# Patient Record
Sex: FEMALE | HISPANIC OR LATINO | Employment: UNEMPLOYED | URBAN - METROPOLITAN AREA
[De-identification: names, ages, dates, MRNs, and addresses within clinical notes are randomized per-mention and may not be internally consistent; named-entity substitution may affect disease eponyms.]

---

## 2024-06-17 ENCOUNTER — OFFICE VISIT (OUTPATIENT)
Dept: FAMILY MEDICINE CLINIC | Age: 74
End: 2024-06-17
Payer: COMMERCIAL

## 2024-06-17 VITALS
HEART RATE: 90 BPM | BODY MASS INDEX: 22.42 KG/M2 | WEIGHT: 114.2 LBS | SYSTOLIC BLOOD PRESSURE: 138 MMHG | DIASTOLIC BLOOD PRESSURE: 88 MMHG | HEIGHT: 60 IN | OXYGEN SATURATION: 99 %

## 2024-06-17 DIAGNOSIS — Z76.89 ENCOUNTER TO ESTABLISH CARE: ICD-10-CM

## 2024-06-17 DIAGNOSIS — Z11.4 SCREENING FOR HIV (HUMAN IMMUNODEFICIENCY VIRUS): ICD-10-CM

## 2024-06-17 DIAGNOSIS — Z11.59 NEED FOR HEPATITIS C SCREENING TEST: ICD-10-CM

## 2024-06-17 DIAGNOSIS — I10 HYPERTENSION, UNSPECIFIED TYPE: ICD-10-CM

## 2024-06-17 DIAGNOSIS — Z87.442 HISTORY OF KIDNEY STONES: ICD-10-CM

## 2024-06-17 DIAGNOSIS — M45.9 ANKYLOSING SPONDYLITIS, UNSPECIFIED SITE OF SPINE (HCC): ICD-10-CM

## 2024-06-17 DIAGNOSIS — Z11.1 TUBERCULOSIS SCREENING: ICD-10-CM

## 2024-06-17 DIAGNOSIS — Z11.59 SCREENING FOR VIRAL DISEASE: ICD-10-CM

## 2024-06-17 DIAGNOSIS — Z12.31 ENCOUNTER FOR SCREENING MAMMOGRAM FOR MALIGNANT NEOPLASM OF BREAST: ICD-10-CM

## 2024-06-17 DIAGNOSIS — Z12.11 SCREENING FOR COLON CANCER: ICD-10-CM

## 2024-06-17 DIAGNOSIS — K80.20 GALLSTONES: ICD-10-CM

## 2024-06-17 DIAGNOSIS — R73.9 HYPERGLYCEMIA: ICD-10-CM

## 2024-06-17 DIAGNOSIS — R73.03 PREDIABETES: Primary | ICD-10-CM

## 2024-06-17 DIAGNOSIS — Z23 NEED FOR PNEUMOCOCCAL VACCINATION: ICD-10-CM

## 2024-06-17 DIAGNOSIS — R53.83 OTHER FATIGUE: ICD-10-CM

## 2024-06-17 DIAGNOSIS — R31.9 HEMATURIA, UNSPECIFIED TYPE: ICD-10-CM

## 2024-06-17 DIAGNOSIS — Z23 NEED FOR TDAP VACCINATION: ICD-10-CM

## 2024-06-17 DIAGNOSIS — Z78.0 POST-MENOPAUSAL: ICD-10-CM

## 2024-06-17 DIAGNOSIS — Z13.220 SCREENING FOR HYPERLIPIDEMIA: ICD-10-CM

## 2024-06-17 DIAGNOSIS — D64.9 ANEMIA, UNSPECIFIED TYPE: ICD-10-CM

## 2024-06-17 PROCEDURE — 90471 IMMUNIZATION ADMIN: CPT | Performed by: NURSE PRACTITIONER

## 2024-06-17 PROCEDURE — 99205 OFFICE O/P NEW HI 60 MIN: CPT | Performed by: NURSE PRACTITIONER

## 2024-06-17 PROCEDURE — 3075F SYST BP GE 130 - 139MM HG: CPT | Performed by: NURSE PRACTITIONER

## 2024-06-17 PROCEDURE — 90677 PCV20 VACCINE IM: CPT | Performed by: NURSE PRACTITIONER

## 2024-06-17 PROCEDURE — 3079F DIAST BP 80-89 MM HG: CPT | Performed by: NURSE PRACTITIONER

## 2024-06-17 PROCEDURE — 1123F ACP DISCUSS/DSCN MKR DOCD: CPT | Performed by: NURSE PRACTITIONER

## 2024-06-17 RX ORDER — BLOOD PRESSURE TEST KIT
KIT MISCELLANEOUS
Qty: 1 KIT | Refills: 0 | Status: SHIPPED | OUTPATIENT
Start: 2024-06-17

## 2024-06-17 RX ORDER — ONDANSETRON 4 MG/1
4 TABLET, ORALLY DISINTEGRATING ORAL 3 TIMES DAILY PRN
Qty: 21 TABLET | Refills: 0 | Status: SHIPPED | OUTPATIENT
Start: 2024-06-17

## 2024-06-17 RX ORDER — AMLODIPINE BESYLATE 5 MG/1
5 TABLET ORAL DAILY
Qty: 30 TABLET | Refills: 3 | Status: SHIPPED | OUTPATIENT
Start: 2024-06-17

## 2024-06-17 SDOH — ECONOMIC STABILITY: FOOD INSECURITY: WITHIN THE PAST 12 MONTHS, YOU WORRIED THAT YOUR FOOD WOULD RUN OUT BEFORE YOU GOT MONEY TO BUY MORE.: NEVER TRUE

## 2024-06-17 SDOH — ECONOMIC STABILITY: HOUSING INSECURITY
IN THE LAST 12 MONTHS, WAS THERE A TIME WHEN YOU DID NOT HAVE A STEADY PLACE TO SLEEP OR SLEPT IN A SHELTER (INCLUDING NOW)?: NO

## 2024-06-17 SDOH — ECONOMIC STABILITY: INCOME INSECURITY: HOW HARD IS IT FOR YOU TO PAY FOR THE VERY BASICS LIKE FOOD, HOUSING, MEDICAL CARE, AND HEATING?: NOT HARD AT ALL

## 2024-06-17 SDOH — ECONOMIC STABILITY: FOOD INSECURITY: WITHIN THE PAST 12 MONTHS, THE FOOD YOU BOUGHT JUST DIDN'T LAST AND YOU DIDN'T HAVE MONEY TO GET MORE.: NEVER TRUE

## 2024-06-17 ASSESSMENT — ENCOUNTER SYMPTOMS
VOMITING: 0
SHORTNESS OF BREATH: 0
BACK PAIN: 1
ABDOMINAL PAIN: 1
ABDOMINAL DISTENTION: 0
ANAL BLEEDING: 0
COUGH: 0
CONSTIPATION: 0
RECTAL PAIN: 0
BLOOD IN STOOL: 0
DIARRHEA: 0
CHEST TIGHTNESS: 0
NAUSEA: 1
WHEEZING: 0

## 2024-06-17 ASSESSMENT — PATIENT HEALTH QUESTIONNAIRE - PHQ9
SUM OF ALL RESPONSES TO PHQ QUESTIONS 1-9: 0
2. FEELING DOWN, DEPRESSED OR HOPELESS: NOT AT ALL
SUM OF ALL RESPONSES TO PHQ QUESTIONS 1-9: 0
SUM OF ALL RESPONSES TO PHQ9 QUESTIONS 1 & 2: 0
1. LITTLE INTEREST OR PLEASURE IN DOING THINGS: NOT AT ALL
SUM OF ALL RESPONSES TO PHQ QUESTIONS 1-9: 0
SUM OF ALL RESPONSES TO PHQ QUESTIONS 1-9: 0

## 2024-06-17 NOTE — PROGRESS NOTES
Visit Information    Have you changed or started any medications since your last visit including any over-the-counter medicines, vitamins, or herbal medicines? no   Are you having any side effects from any of your medications? -  no  Have you stopped taking any of your medications? Is so, why? -  no    Have you seen any other physician or provider since your last visit? No  Have you had any other diagnostic tests since your last visit? No  Have you been seen in the emergency room and/or had an admission to a hospital since we last saw you? No  Have you had your routine dental cleaning in the past 6 months? no    Have you activated your [employer] account? If not, what are your barriers? Yes     Patient Care Team:  Alen Samuel APRN - CNP as PCP - General (Nurse Practitioner)    Medical History Review  Past Medical, Family, and Social History reviewed and does contribute to the patient presenting condition    Health Maintenance   Topic Date Due    COVID-19 Vaccine (1) Never done    Depression Screen  Never done    Hepatitis C screen  Never done    DTaP/Tdap/Td vaccine (1 - Tdap) Never done    Lipids  Never done    Breast cancer screen  Never done    Colorectal Cancer Screen  Never done    Shingles vaccine (1 of 2) Never done    DEXA (modify frequency per FRAX score)  Never done    Respiratory Syncytial Virus (RSV) Pregnant or age 60 yrs+ (1 - 1-dose 60+ series) Never done    Pneumococcal 65+ years Vaccine (1 of 1 - PCV) Never done    Flu vaccine (Season Ended) 08/01/2024    Hepatitis A vaccine  Aged Out    Hepatitis B vaccine  Aged Out    Hib vaccine  Aged Out    Polio vaccine  Aged Out    Meningococcal (ACWY) vaccine  Aged Out     
 SECTION      x 2       Family History   Problem Relation Age of Onset    Diabetes Mother        Social History     Tobacco Use    Smoking status: Never    Smokeless tobacco: Never   Vaping Use    Vaping Use: Never used   Substance Use Topics    Alcohol use: Never    Drug use: Never         SUBJECTIVE/OBJECTIVE:    Patient is here today with her granddaughter to establish care.  She recently moved here from [place] about a month ago.  Reports a history of prediabetes and hypertension, was previously on amlodipine and metformin, medications refilled.  Also reports a history of ankylosing spondylitis and was following with a rheumatologist in [place], I will place a new referral for her.    Patient's granddaughter also reports the patient has a history of gallstones, there is talk about possible cholecystectomy when she was in [place].  She tells me that as of recently patient has had a decrease in her appetite, and has had some abdominal pain.  On my exam, her abdomen is nontender and soft.  Patient's daughter also tells me that there has been some blood in her stool, she does have a known history of kidney stones as well as urinary tract infections.  No CVA tenderness bilaterally, I will order an ultrasound of the gallbladder as well as a CT scan of the abdomen pelvis.  I will also send referral to GI.  Also patient has a history of anemia.  Referral also sent to urology.    Baseline lab work ordered.    Would like to see the patient back in about  2 weeks for blood pressure check.  Blood pressure is well-controlled today, will restart her amlodipine.  Patient and granddaughter are in agreements with plan of care.    She is due for mammogram and DEXA scan, orders have been placed for this.  No history of colonoscopy, will place order for Cologuard.  Denies having any family history of colon cancer that is known, no history of any blood in her stool.     [x]Negative depression screening.   []1-4 = Minimal depression

## 2024-06-19 ENCOUNTER — HOSPITAL ENCOUNTER (OUTPATIENT)
Dept: WOMENS IMAGING | Age: 74
Discharge: HOME | End: 2024-06-21
Payer: COMMERCIAL

## 2024-06-19 ENCOUNTER — HOSPITAL ENCOUNTER (OUTPATIENT)
Dept: CT IMAGING | Age: 74
Discharge: HOME | End: 2024-06-21
Payer: COMMERCIAL

## 2024-06-19 ENCOUNTER — HOSPITAL ENCOUNTER (OUTPATIENT)
Age: 74
Discharge: HOME | End: 2024-06-19
Payer: COMMERCIAL

## 2024-06-19 DIAGNOSIS — N28.1 RENAL CYST, LEFT: ICD-10-CM

## 2024-06-19 DIAGNOSIS — K59.00 CONSTIPATION, UNSPECIFIED CONSTIPATION TYPE: ICD-10-CM

## 2024-06-19 DIAGNOSIS — R31.9 HEMATURIA, UNSPECIFIED TYPE: ICD-10-CM

## 2024-06-19 DIAGNOSIS — R80.9 PROTEINURIA, UNSPECIFIED TYPE: ICD-10-CM

## 2024-06-19 DIAGNOSIS — K80.20 CALCULUS OF GALLBLADDER WITHOUT CHOLECYSTITIS WITHOUT OBSTRUCTION: Primary | ICD-10-CM

## 2024-06-19 DIAGNOSIS — E55.9 VITAMIN D DEFICIENCY: Primary | ICD-10-CM

## 2024-06-19 DIAGNOSIS — R31.9 URINARY TRACT INFECTION WITH HEMATURIA, SITE UNSPECIFIED: ICD-10-CM

## 2024-06-19 DIAGNOSIS — Z13.220 SCREENING FOR HYPERLIPIDEMIA: ICD-10-CM

## 2024-06-19 DIAGNOSIS — Z11.4 SCREENING FOR HIV (HUMAN IMMUNODEFICIENCY VIRUS): ICD-10-CM

## 2024-06-19 DIAGNOSIS — J90 PLEURAL EFFUSION: ICD-10-CM

## 2024-06-19 DIAGNOSIS — D64.9 ANEMIA, UNSPECIFIED TYPE: ICD-10-CM

## 2024-06-19 DIAGNOSIS — R73.9 HYPERGLYCEMIA: ICD-10-CM

## 2024-06-19 DIAGNOSIS — Z78.0 POST-MENOPAUSAL: ICD-10-CM

## 2024-06-19 DIAGNOSIS — D50.9 IRON DEFICIENCY ANEMIA, UNSPECIFIED IRON DEFICIENCY ANEMIA TYPE: ICD-10-CM

## 2024-06-19 DIAGNOSIS — R53.83 OTHER FATIGUE: ICD-10-CM

## 2024-06-19 DIAGNOSIS — Z11.1 TUBERCULOSIS SCREENING: ICD-10-CM

## 2024-06-19 DIAGNOSIS — Z12.31 ENCOUNTER FOR SCREENING MAMMOGRAM FOR MALIGNANT NEOPLASM OF BREAST: ICD-10-CM

## 2024-06-19 DIAGNOSIS — Z11.59 NEED FOR HEPATITIS C SCREENING TEST: ICD-10-CM

## 2024-06-19 DIAGNOSIS — Z87.442 HISTORY OF KIDNEY STONES: ICD-10-CM

## 2024-06-19 DIAGNOSIS — Z11.59 SCREENING FOR VIRAL DISEASE: ICD-10-CM

## 2024-06-19 DIAGNOSIS — K80.20 GALLSTONES: ICD-10-CM

## 2024-06-19 DIAGNOSIS — I10 HYPERTENSION, UNSPECIFIED TYPE: ICD-10-CM

## 2024-06-19 DIAGNOSIS — N39.0 URINARY TRACT INFECTION WITH HEMATURIA, SITE UNSPECIFIED: ICD-10-CM

## 2024-06-19 LAB
25(OH)D3 SERPL-MCNC: 14.1 NG/ML (ref 30–100)
ALBUMIN SERPL-MCNC: 3.3 G/DL (ref 3.5–5.2)
ALP SERPL-CCNC: 105 U/L (ref 35–104)
ALT SERPL-CCNC: 11 U/L (ref 5–33)
ANION GAP SERPL CALCULATED.3IONS-SCNC: 10 MMOL/L (ref 9–17)
AST SERPL-CCNC: 17 U/L
BACTERIA URNS QL MICRO: ABNORMAL
BILIRUB SERPL-MCNC: 0.3 MG/DL (ref 0.3–1.2)
BILIRUB UR QL STRIP: NEGATIVE
BUN SERPL-MCNC: 16 MG/DL (ref 8–23)
CALCIUM SERPL-MCNC: 8.6 MG/DL (ref 8.6–10.4)
CASTS #/AREA URNS LPF: ABNORMAL /LPF
CHLORIDE SERPL-SCNC: 105 MMOL/L (ref 98–107)
CHOLEST SERPL-MCNC: 111 MG/DL
CHOLESTEROL/HDL RATIO: 2.8
CLARITY UR: CLEAR
CO2 SERPL-SCNC: 25 MMOL/L (ref 20–31)
COLOR UR: YELLOW
CREAT SERPL-MCNC: 0.7 MG/DL (ref 0.5–0.9)
EPI CELLS #/AREA URNS HPF: ABNORMAL /HPF
ERYTHROCYTE [DISTWIDTH] IN BLOOD BY AUTOMATED COUNT: 13.7 % (ref 11.5–14.9)
EST. AVERAGE GLUCOSE BLD GHB EST-MCNC: 114 MG/DL
FERRITIN SERPL-MCNC: 49 NG/ML (ref 13–150)
FOLATE SERPL-MCNC: 6.3 NG/ML (ref 4.8–24.2)
GFR, ESTIMATED: >90 ML/MIN/1.73M2
GLUCOSE SERPL-MCNC: 90 MG/DL (ref 70–99)
GLUCOSE UR STRIP-MCNC: NEGATIVE MG/DL
HBA1C MFR BLD: 5.6 % (ref 4–6)
HBV SURFACE AB SERPL IA-ACNC: <3.5 MIU/ML
HCT VFR BLD AUTO: 35 % (ref 36–46)
HCV AB SERPL QL IA: NONREACTIVE
HDLC SERPL-MCNC: 40 MG/DL
HGB BLD-MCNC: 11.5 G/DL (ref 12–16)
HGB UR QL STRIP.AUTO: ABNORMAL
HIV 1+2 AB+HIV1 P24 AG SERPL QL IA: NONREACTIVE
IRON SATN MFR SERPL: 22 % (ref 20–55)
IRON SERPL-MCNC: 44 UG/DL (ref 37–145)
KETONES UR STRIP-MCNC: NEGATIVE MG/DL
LDLC SERPL CALC-MCNC: 52 MG/DL (ref 0–130)
LEUKOCYTE ESTERASE UR QL STRIP: ABNORMAL
MAGNESIUM SERPL-MCNC: 2.1 MG/DL (ref 1.6–2.6)
MCH RBC QN AUTO: 32.3 PG (ref 26–34)
MCHC RBC AUTO-ENTMCNC: 32.9 G/DL (ref 31–37)
MCV RBC AUTO: 98.3 FL (ref 80–100)
NITRITE UR QL STRIP: NEGATIVE
PH UR STRIP: 6.5 [PH] (ref 5–8)
PHOSPHATE SERPL-MCNC: 3.5 MG/DL (ref 2.6–4.5)
PLATELET # BLD AUTO: 172 K/UL (ref 150–450)
PMV BLD AUTO: 9.6 FL (ref 6–12)
POTASSIUM SERPL-SCNC: 4.8 MMOL/L (ref 3.7–5.3)
PROT SERPL-MCNC: 8 G/DL (ref 6.4–8.3)
PROT UR STRIP-MCNC: ABNORMAL MG/DL
RBC # BLD AUTO: 3.56 M/UL (ref 4–5.2)
RBC #/AREA URNS HPF: ABNORMAL /HPF
SODIUM SERPL-SCNC: 140 MMOL/L (ref 135–144)
SP GR UR STRIP: 1.01 (ref 1–1.03)
TIBC SERPL-MCNC: 199 UG/DL (ref 250–450)
TRIGL SERPL-MCNC: 94 MG/DL
TSH SERPL DL<=0.05 MIU/L-ACNC: 1.28 UIU/ML (ref 0.3–5)
UNSATURATED IRON BINDING CAPACITY: 155 UG/DL (ref 112–347)
URATE SERPL-MCNC: 5.9 MG/DL (ref 2.4–5.7)
UROBILINOGEN UR STRIP-ACNC: NORMAL EU/DL (ref 0–1)
VIT B12 SERPL-MCNC: 901 PG/ML (ref 232–1245)
WBC #/AREA URNS HPF: ABNORMAL /HPF
WBC OTHER # BLD: 9.1 K/UL (ref 3.5–11)

## 2024-06-19 PROCEDURE — 84443 ASSAY THYROID STIM HORMONE: CPT

## 2024-06-19 PROCEDURE — 87389 HIV-1 AG W/HIV-1&-2 AB AG IA: CPT

## 2024-06-19 PROCEDURE — 77080 DXA BONE DENSITY AXIAL: CPT

## 2024-06-19 PROCEDURE — 87086 URINE CULTURE/COLONY COUNT: CPT

## 2024-06-19 PROCEDURE — 82306 VITAMIN D 25 HYDROXY: CPT

## 2024-06-19 PROCEDURE — 80053 COMPREHEN METABOLIC PANEL: CPT

## 2024-06-19 PROCEDURE — 82728 ASSAY OF FERRITIN: CPT

## 2024-06-19 PROCEDURE — 83735 ASSAY OF MAGNESIUM: CPT

## 2024-06-19 PROCEDURE — 84100 ASSAY OF PHOSPHORUS: CPT

## 2024-06-19 PROCEDURE — 36415 COLL VENOUS BLD VENIPUNCTURE: CPT

## 2024-06-19 PROCEDURE — 82607 VITAMIN B-12: CPT

## 2024-06-19 PROCEDURE — 86480 TB TEST CELL IMMUN MEASURE: CPT

## 2024-06-19 PROCEDURE — 82746 ASSAY OF FOLIC ACID SERUM: CPT

## 2024-06-19 PROCEDURE — 76705 ECHO EXAM OF ABDOMEN: CPT

## 2024-06-19 PROCEDURE — 84550 ASSAY OF BLOOD/URIC ACID: CPT

## 2024-06-19 PROCEDURE — 86803 HEPATITIS C AB TEST: CPT

## 2024-06-19 PROCEDURE — 85027 COMPLETE CBC AUTOMATED: CPT

## 2024-06-19 PROCEDURE — 74176 CT ABD & PELVIS W/O CONTRAST: CPT

## 2024-06-19 PROCEDURE — 81001 URINALYSIS AUTO W/SCOPE: CPT

## 2024-06-19 PROCEDURE — 83540 ASSAY OF IRON: CPT

## 2024-06-19 PROCEDURE — 80061 LIPID PANEL: CPT

## 2024-06-19 PROCEDURE — 83550 IRON BINDING TEST: CPT

## 2024-06-19 PROCEDURE — 86317 IMMUNOASSAY INFECTIOUS AGENT: CPT

## 2024-06-19 PROCEDURE — 83036 HEMOGLOBIN GLYCOSYLATED A1C: CPT

## 2024-06-19 PROCEDURE — 77063 BREAST TOMOSYNTHESIS BI: CPT

## 2024-06-19 RX ORDER — AZITHROMYCIN 250 MG/1
TABLET, FILM COATED ORAL
Qty: 6 TABLET | Refills: 0 | Status: SHIPPED | OUTPATIENT
Start: 2024-06-19 | End: 2024-06-24

## 2024-06-19 RX ORDER — CEPHALEXIN 500 MG/1
500 CAPSULE ORAL 4 TIMES DAILY
Qty: 28 CAPSULE | Refills: 0 | Status: SHIPPED | OUTPATIENT
Start: 2024-06-19 | End: 2024-06-26

## 2024-06-19 RX ORDER — ERGOCALCIFEROL 1.25 MG/1
50000 CAPSULE ORAL WEEKLY
Qty: 12 CAPSULE | Refills: 1 | Status: SHIPPED | OUTPATIENT
Start: 2024-06-19

## 2024-06-19 RX ORDER — POLYETHYLENE GLYCOL 3350 17 G/17G
17 POWDER, FOR SOLUTION ORAL DAILY PRN
Qty: 238 G | Refills: 3 | Status: SHIPPED | OUTPATIENT
Start: 2024-06-19

## 2024-06-19 RX ORDER — FERROUS SULFATE 325(65) MG
325 TABLET ORAL 2 TIMES DAILY
Qty: 180 TABLET | Refills: 1 | Status: SHIPPED | OUTPATIENT
Start: 2024-06-19

## 2024-06-19 NOTE — RESULT ENCOUNTER NOTE
Please notify patient that it looks like she has a urinary tract infection, I am going to treat her with an antibiotic.  She also has some blood in her urine which can be secondary to urinary tract infection.  I would like to recheck her urine in 1 week, if there is still blood present I would recommend we follow-up with urology.    Her iron is low, I think we should put her on a supplement, and some anemia is noted. Follow-up with GI as discussed, and I will also send referral to hematology.    Uric acid is a bit elevated, this can lead to kidney stones, recommending decreasing purine rich foods.  Can consider adding allopurinol as well, please let me know if this is of the patient would be interested in.    Vitamin D is very low, will place on high-dose supplement and recheck levels in 3 months.    Alkaline phosphatase is a bit elevated which is common in postmenopausal women.  Will keep an eye on this.    Lipid panel appears to be within normal limits.    A1c is normal at 5.6.    Not immune to hepatitis B and would benefit for immunization.    Some protein noted in the urine as well as a slightly low albumin level.  May benefit to follow-up with nephrology.    Other labs seem to be within normal limits.

## 2024-06-19 NOTE — RESULT ENCOUNTER NOTE
Please notify patient that ultrasound of the gallbladder as well as CT scan showing gallstones but no cholecystitis.  Recommend follow-up with GI, I will also place referral to general surgery.    Constipation is noted, recommend a stool softener and I can send a prescription for this.    No kidney stones or hydronephrosis is noted, there is a left kidney cyst, recommending follow-up with an ultrasound which I will place an order for this as well.    Enlarged spleen and liver, follow-up with GI.    Bladder looks to be fluid-filled, consistent with possible cystitis, awaiting UA results.    Some changes noted in the lungs, trace effusions, some groundglass opacities are noted.  I would like to repeat a chest x-ray in 1 week.  She is not having any symptoms of illness at this time.  Would not hurt to treat with a round of antibiotics to see if symptoms improve, and once again repeat chest x-ray in 1 week.

## 2024-06-20 LAB
MICROORGANISM SPEC CULT: NORMAL
SPECIMEN DESCRIPTION: NORMAL

## 2024-06-20 NOTE — RESULT ENCOUNTER NOTE
Please notify patient that urine culture showing no growth.  Given the findings on the CT scan as well as her UA, she can complete the round of antibiotics so.

## 2024-06-20 NOTE — RESULT ENCOUNTER NOTE
Please notify patient that osteoporosis noted on DEXA scan.  Continue vitamin D supplementation, should consider adding Prolia or Fosamax to the regimen, please let me know her thoughts on this.

## 2024-06-21 LAB
QUANTI TB GOLD PLUS: NEGATIVE
QUANTI TB1 MINUS NIL: 0 IU/ML
QUANTI TB2 MINUS NIL: 0.01 IU/ML
QUANTIFERON MITOGEN: 2.02 IU/ML
QUANTIFERON NIL: 0.05 IU/ML

## 2024-06-24 ENCOUNTER — HOSPITAL ENCOUNTER (OUTPATIENT)
Age: 74
Discharge: HOME | End: 2024-06-24
Payer: COMMERCIAL

## 2024-06-24 ENCOUNTER — HOSPITAL ENCOUNTER (OUTPATIENT)
Dept: GENERAL RADIOLOGY | Age: 74
Discharge: HOME | End: 2024-06-26
Payer: COMMERCIAL

## 2024-06-24 ENCOUNTER — HOSPITAL ENCOUNTER (OUTPATIENT)
Age: 74
Discharge: HOME | End: 2024-06-26
Payer: COMMERCIAL

## 2024-06-24 DIAGNOSIS — N39.0 URINARY TRACT INFECTION WITH HEMATURIA, SITE UNSPECIFIED: ICD-10-CM

## 2024-06-24 DIAGNOSIS — J90 PLEURAL EFFUSION: ICD-10-CM

## 2024-06-24 DIAGNOSIS — R31.9 URINARY TRACT INFECTION WITH HEMATURIA, SITE UNSPECIFIED: ICD-10-CM

## 2024-06-24 LAB
25(OH)D3 SERPL-MCNC: 13.6 NG/ML (ref 30–100)
BACTERIA URNS QL MICRO: ABNORMAL
BILIRUB UR QL STRIP: NEGATIVE
CASTS #/AREA URNS LPF: ABNORMAL /LPF
CLARITY UR: CLEAR
COLOR UR: YELLOW
EPI CELLS #/AREA URNS HPF: ABNORMAL /HPF
GLUCOSE UR STRIP-MCNC: NEGATIVE MG/DL
HGB UR QL STRIP.AUTO: ABNORMAL
KETONES UR STRIP-MCNC: NEGATIVE MG/DL
LEUKOCYTE ESTERASE UR QL STRIP: ABNORMAL
NITRITE UR QL STRIP: NEGATIVE
PH UR STRIP: 5.5 [PH] (ref 5–8)
PROT UR STRIP-MCNC: NEGATIVE MG/DL
RBC #/AREA URNS HPF: ABNORMAL /HPF
SP GR UR STRIP: 1.01 (ref 1–1.03)
UROBILINOGEN UR STRIP-ACNC: NORMAL EU/DL (ref 0–1)
WBC #/AREA URNS HPF: ABNORMAL /HPF

## 2024-06-24 PROCEDURE — 71046 X-RAY EXAM CHEST 2 VIEWS: CPT

## 2024-06-24 PROCEDURE — 36415 COLL VENOUS BLD VENIPUNCTURE: CPT

## 2024-06-24 PROCEDURE — 81001 URINALYSIS AUTO W/SCOPE: CPT

## 2024-06-24 PROCEDURE — 82306 VITAMIN D 25 HYDROXY: CPT

## 2024-06-24 PROCEDURE — 87086 URINE CULTURE/COLONY COUNT: CPT

## 2024-06-24 RX ORDER — CEPHALEXIN 500 MG/1
500 CAPSULE ORAL 4 TIMES DAILY
Qty: 12 CAPSULE | Refills: 0 | Status: SHIPPED | OUTPATIENT
Start: 2024-06-24 | End: 2024-06-27

## 2024-06-25 LAB
MICROORGANISM SPEC CULT: NORMAL
SPECIMEN DESCRIPTION: NORMAL

## 2024-06-25 NOTE — H&P
HISTORY and PHYSICAL  [facility]       NAME:  Evelyn Jerry  MRN: [id number]   YOB: 1950   Date: 6/26/2024   Age: 73 y.o.  Gender: female     COMPLAINT AND PRESENT HISTORY:   Evelyn Jerry is 73 y.o.,  female, presents for pre-anesthesia/admission testing for CHOLECYSTECTOMY LAPAROSCOPIC ROBOTIC XI per Dr. Turcios.    Primary dx: Acute cholecystitis [K81.0].    HPI:  SEE PORTION OF THE NOTE BELOW PER DR TURCIOS FROM OFFICE VISIT ON 6/24/24    HxCC:  72 y/o female presents for evaluation of abdominal pain.  Patient is non English speaking so accompanied by son.  Patient describes sharp and crampy right upper quadrant pain that is worse with fatty foods.  Complains of associated nausea.  Denies changes in bowel habits.      UPDATE HPI:    Evelyn Jerry is 73 y.o.,  female,is non English speaking,  present today with her.   Pt complains of RUQ and Epigastric constant sharp/strong pains related to meals.but not to bowel movements. The pain radiates to the Rt back but not to the shoulder.  Symptoms started 10 ears ago and it getting worse in the last 4 years.   Pt has intolerance to greasy and spicy foods. Pt also complains of nausea, vomiting.  She has constipation, she has BM once per day. She has changes in the color of the stools as tan or light green color.  No fever or chills.     RECENT IMAGING R/T HPI   XR CHEST (2 VW)    Result Date: 6/25/2024  No acute process.     RICHA TANG DIGITAL SCREEN BILATERAL    Result Date: 6/20/2024  No evidence of malignancy.  Advise annual screening mammography. BIRADS - CATEGORY 1 Negative.  Normal interval follow-up is recommended in 12 months. OVERALL ASSESSMENT - NEGATIVE A letter of notification will be sent to the patient regarding the results. The American College of Radiology recommends annual mammograms for women 40 years and older. BIRADS: BIRADS - CATEGORY 1 Negative, no evidence of malignancy.  Normal interval follow-up is recommended in 12

## 2024-06-25 NOTE — RESULT ENCOUNTER NOTE
Please notify patient that UTI is improving but still there, will extend the Keflex for another 3 days.  Awaiting urine culture results.  I see that vitamin D was checked, this is to be checked in 3 months.  Continue current supplement.

## 2024-06-25 NOTE — RESULT ENCOUNTER NOTE
Please notify patient that chest x-ray is now clear.  Nothing acute noted.  No effusion noted on this imaging.

## 2024-06-25 NOTE — H&P (VIEW-ONLY)
day. Goal blood pressure less than 135/85, and above 110/60. 1 kit 0     No current facility-administered medications on file prior to encounter.       Review of Systems   Constitutional:  Positive for appetite change and fatigue.   HENT: Negative.          Pt has full denture    Respiratory: Negative.     Cardiovascular: Negative.    Gastrointestinal:  Positive for constipation, nausea and vomiting.        RUQ pain    Genitourinary:  Positive for dysuria and hematuria.        History of kidney stones and urinary tract infections and she is on antibiotic tomorrow is the last dose.    Musculoskeletal:  Positive for arthralgias, myalgias, neck pain and neck stiffness.   Skin: Negative.    Neurological:  Positive for weakness. Negative for dizziness and light-headedness.   Hematological:  Bruises/bleeds easily (bruises easily).       GENERAL PHYSICAL EXAM     Vitals: BP (!) 147/71   Pulse (!) 101   Temp 98.2 °F (36.8 °C) (Infrared)   Resp 18   Ht 1.524 m (5')   Wt 49.9 kg (110 lb)   SpO2 96%   BMI 21.48 kg/m²               Physical Exam  Constitutional:       General: She is not in acute distress.     Appearance: Normal appearance. She is normal weight. She is not ill-appearing.   HENT:      Head: Normocephalic.      Right Ear: External ear normal.      Left Ear: External ear normal.      Nose: Nose normal.      Mouth/Throat:      Mouth: Mucous membranes are moist.      Comments: Pt has full denture   Eyes:      General:         Right eye: No discharge.         Left eye: No discharge.   Cardiovascular:      Rate and Rhythm: Tachycardia present.      Pulses: Normal pulses.      Heart sounds: Normal heart sounds.   Pulmonary:      Effort: Pulmonary effort is normal.      Breath sounds: Normal breath sounds. No wheezing or rhonchi.   Abdominal:      General: Bowel sounds are normal. There is distension.      Palpations: Abdomen is soft. There is no mass.      Tenderness: There is abdominal tenderness (tenderness

## 2024-06-26 ENCOUNTER — HOSPITAL ENCOUNTER (OUTPATIENT)
Dept: PREADMISSION TESTING | Age: 74
Discharge: HOME | End: 2024-06-30
Attending: SURGERY
Payer: COMMERCIAL

## 2024-06-26 VITALS
SYSTOLIC BLOOD PRESSURE: 147 MMHG | HEIGHT: 60 IN | TEMPERATURE: 98.2 F | RESPIRATION RATE: 18 BRPM | OXYGEN SATURATION: 96 % | HEART RATE: 101 BPM | BODY MASS INDEX: 21.6 KG/M2 | WEIGHT: 110 LBS | DIASTOLIC BLOOD PRESSURE: 71 MMHG

## 2024-06-26 DIAGNOSIS — Z01.818 PREOP EXAMINATION: Primary | ICD-10-CM

## 2024-06-26 PROCEDURE — APPSS45 APP SPLIT SHARED TIME 31-45 MINUTES: Performed by: NURSE PRACTITIONER

## 2024-06-26 PROCEDURE — 93005 ELECTROCARDIOGRAM TRACING: CPT | Performed by: ANESTHESIOLOGY

## 2024-06-26 RX ORDER — IBUPROFEN 200 MG
200 TABLET ORAL EVERY 6 HOURS PRN
COMMUNITY

## 2024-06-26 RX ORDER — INDOMETHACIN 75 MG/1
75 CAPSULE, EXTENDED RELEASE ORAL
COMMUNITY

## 2024-06-26 ASSESSMENT — PAIN DESCRIPTION - ORIENTATION: ORIENTATION: ANTERIOR

## 2024-06-26 ASSESSMENT — PAIN SCALES - GENERAL: PAINLEVEL_OUTOF10: 6

## 2024-06-26 ASSESSMENT — PAIN DESCRIPTION - LOCATION: LOCATION: HEAD

## 2024-06-26 NOTE — DISCHARGE INSTRUCTIONS
Pre-op Instructions For Out-Patient Surgery    Medication Instructions:  Please stop herbs and any supplements now (includes vitamins and minerals).    Please contact your surgeon and prescribing physician for pre-op instructions for any blood thinners.    If you have inhalers/aerosol treatments at home, please use them the morning of your surgery and bring the inhalers with you to the hospital.    Please take the following medications the morning of your surgery with a sip of water:    Amlodipine    Surgery Instructions:  After midnight before surgery:  Do not eat or drink anything, including water, mints, gum, and hard candy.  You may brush your teeth without swallowing.  No smoking, chewing tobacco, or street drugs.    Please shower or bathe before surgery.  If you were given Surgical Scrub Chlorhexidine Gluconate Liquid (CHG), please shower the night before and the morning of your surgery following the detailed instructions you received during your pre-admission visit.     Please do not wear any cologne, lotion, powder, deodorant, jewelry, piercings, perfume, makeup, nail polish, hair accessories, or hair spray on the day of surgery.  Wear loose comfortable clothing.    Leave your valuables at home but bring a payment source for any after-surgery prescriptions you plan to fill at [facility].  Bring a storage case for any glasses/contacts.    An adult who is responsible for you MUST drive you home and should be with you for the first 24 hours after surgery.     If having out-patient knee and foot surgeries, please arrange for planned crutches, walker, or wheelchair before arriving to the hospital.    The Day of Surgery:  Arrive at [facility] Surgery Entrance at the time directed by your surgeon and check in at the desk.     If you have a living will or healthcare power of , please bring a copy.    You will be taken to the pre-op holding area where you

## 2024-06-27 ENCOUNTER — TELEPHONE (OUTPATIENT)
Dept: ONCOLOGY | Age: 74
End: 2024-06-27

## 2024-06-27 NOTE — TELEPHONE ENCOUNTER
Lvm for pt to return office call to schedule consult .   Electronically signed by Mary Hughes MA on 6/27/2024 at 10:17 AM

## 2024-06-30 LAB
EKG ATRIAL RATE: 95 BPM
EKG P AXIS: 44 DEGREES
EKG P-R INTERVAL: 150 MS
EKG Q-T INTERVAL: 360 MS
EKG QRS DURATION: 72 MS
EKG QTC CALCULATION (BAZETT): 452 MS
EKG R AXIS: -40 DEGREES
EKG T AXIS: 49 DEGREES
EKG VENTRICULAR RATE: 95 BPM

## 2024-07-03 ENCOUNTER — NURSE ONLY (OUTPATIENT)
Dept: FAMILY MEDICINE CLINIC | Age: 74
End: 2024-07-03
Payer: COMMERCIAL

## 2024-07-03 VITALS — DIASTOLIC BLOOD PRESSURE: 64 MMHG | SYSTOLIC BLOOD PRESSURE: 132 MMHG | HEART RATE: 91 BPM | OXYGEN SATURATION: 96 %

## 2024-07-03 DIAGNOSIS — I10 HYPERTENSION, UNSPECIFIED TYPE: Primary | ICD-10-CM

## 2024-07-03 PROCEDURE — 3078F DIAST BP <80 MM HG: CPT | Performed by: NURSE PRACTITIONER

## 2024-07-03 PROCEDURE — 99212 OFFICE O/P EST SF 10 MIN: CPT | Performed by: NURSE PRACTITIONER

## 2024-07-03 PROCEDURE — 1123F ACP DISCUSS/DSCN MKR DOCD: CPT | Performed by: NURSE PRACTITIONER

## 2024-07-03 PROCEDURE — 3075F SYST BP GE 130 - 139MM HG: CPT | Performed by: NURSE PRACTITIONER

## 2024-07-03 NOTE — PROGRESS NOTES
Evelyn Jerry is being seen today in office for a LAB/MA visit for a Blood Pressure Recheck.     Evelyn Jerry was last seen 6/17/2024 and her Blood Pressure was:   BP Readings from Last 1 Encounters:   07/03/24 (!) 144/74         Blood Pressure taken today 7/3/2024 was:  (1st blood pressure reading): 144/74.   (2nd blood pressure reading): 132/64.         Destiny Casas

## 2024-07-03 NOTE — PROGRESS NOTES
Initial blood pressure was a little elevated, repeat looked good.  Continue checking blood pressure at home and continue current regimen for now.    Vitals:    07/03/24 0911   BP: 132/64   Pulse: 91   SpO2: 96%        Electronically signed by EFREM Madera CNP on 7/3/2024 at 9:51 AM

## 2024-07-08 ENCOUNTER — TELEPHONE (OUTPATIENT)
Dept: ONCOLOGY | Age: 74
End: 2024-07-08

## 2024-07-08 NOTE — TELEPHONE ENCOUNTER
Office received a referral for patient to see MD we called to schedule patient LVM      Electronically signed by Katlyn Clark on 7/8/2024 at 1:17 PM

## 2024-07-12 ENCOUNTER — ANESTHESIA EVENT (OUTPATIENT)
Dept: OPERATING ROOM | Age: 74
End: 2024-07-12
Payer: COMMERCIAL

## 2024-07-12 NOTE — PRE-PROCEDURE INSTRUCTIONS
No answer, left message ?       YES                      Unable to leave message ?    When were you told to arrive at hospital ?  1030    Do you have a  ?     Are you on any blood thinners ?                     If yes when did you stop taking ?    Do you have your prep Rx filled and instruction ?      Nothing to eat the day before , only clear liquids.    Are you experiencing any covid symptoms ?     Do you have any infections or rash we should be aware of ?      Do you have the Hibiclens soap to use the night before and the morning of surgery ?    Nothing to eat or drink after midnight, only a sip of water to take any medication instructed to take the night before.  Wear comfortable clothing, leave any valuables at home, remove any jewelry and body piercing .   MESSAGE LEFT WITH DETAILS.

## 2024-07-15 ENCOUNTER — HOSPITAL ENCOUNTER (OUTPATIENT)
Age: 74
Setting detail: OUTPATIENT SURGERY
Discharge: HOME | End: 2024-07-15
Attending: SURGERY | Admitting: SURGERY
Payer: COMMERCIAL

## 2024-07-15 ENCOUNTER — ANESTHESIA (OUTPATIENT)
Dept: OPERATING ROOM | Age: 74
End: 2024-07-15
Payer: COMMERCIAL

## 2024-07-15 VITALS
BODY MASS INDEX: 21.6 KG/M2 | WEIGHT: 110 LBS | OXYGEN SATURATION: 95 % | TEMPERATURE: 97 F | SYSTOLIC BLOOD PRESSURE: 122 MMHG | DIASTOLIC BLOOD PRESSURE: 64 MMHG | RESPIRATION RATE: 16 BRPM | HEIGHT: 60 IN | HEART RATE: 91 BPM

## 2024-07-15 DIAGNOSIS — K81.0 ACUTE CHOLECYSTITIS: ICD-10-CM

## 2024-07-15 DIAGNOSIS — G89.18 POST-OP PAIN: Primary | ICD-10-CM

## 2024-07-15 LAB — GLUCOSE BLD-MCNC: 106 MG/DL (ref 65–105)

## 2024-07-15 PROCEDURE — 2709999900 HC NON-CHARGEABLE SUPPLY: Performed by: SURGERY

## 2024-07-15 PROCEDURE — 3600000019 HC SURGERY ROBOT ADDTL 15MIN: Performed by: SURGERY

## 2024-07-15 PROCEDURE — 3700000001 HC ADD 15 MINUTES (ANESTHESIA): Performed by: SURGERY

## 2024-07-15 PROCEDURE — 88304 TISSUE EXAM BY PATHOLOGIST: CPT

## 2024-07-15 PROCEDURE — 6360000002 HC RX W HCPCS: Performed by: ANESTHESIOLOGY

## 2024-07-15 PROCEDURE — S2900 ROBOTIC SURGICAL SYSTEM: HCPCS | Performed by: SURGERY

## 2024-07-15 PROCEDURE — 2580000003 HC RX 258: Performed by: ANESTHESIOLOGY

## 2024-07-15 PROCEDURE — 7100000031 HC ASPR PHASE II RECOVERY - ADDTL 15 MIN: Performed by: SURGERY

## 2024-07-15 PROCEDURE — 82947 ASSAY GLUCOSE BLOOD QUANT: CPT

## 2024-07-15 PROCEDURE — 6360000002 HC RX W HCPCS: Performed by: SURGERY

## 2024-07-15 PROCEDURE — 6370000000 HC RX 637 (ALT 250 FOR IP): Performed by: ANESTHESIOLOGY

## 2024-07-15 PROCEDURE — 3700000000 HC ANESTHESIA ATTENDED CARE: Performed by: SURGERY

## 2024-07-15 PROCEDURE — 3600000009 HC SURGERY ROBOT BASE: Performed by: SURGERY

## 2024-07-15 PROCEDURE — 2500000003 HC RX 250 WO HCPCS: Performed by: NURSE ANESTHETIST, CERTIFIED REGISTERED

## 2024-07-15 PROCEDURE — 7100000001 HC PACU RECOVERY - ADDTL 15 MIN: Performed by: SURGERY

## 2024-07-15 PROCEDURE — 6360000002 HC RX W HCPCS: Performed by: NURSE ANESTHETIST, CERTIFIED REGISTERED

## 2024-07-15 PROCEDURE — 7100000030 HC ASPR PHASE II RECOVERY - FIRST 15 MIN: Performed by: SURGERY

## 2024-07-15 PROCEDURE — 2500000003 HC RX 250 WO HCPCS: Performed by: SURGERY

## 2024-07-15 PROCEDURE — C1889 IMPLANT/INSERT DEVICE, NOC: HCPCS | Performed by: SURGERY

## 2024-07-15 PROCEDURE — 7100000000 HC PACU RECOVERY - FIRST 15 MIN: Performed by: SURGERY

## 2024-07-15 PROCEDURE — 2500000003 HC RX 250 WO HCPCS: Performed by: ANESTHESIOLOGY

## 2024-07-15 DEVICE — CLIP INT L POLYMER LOK LIG HEM O LOK (6EA/PK): Type: IMPLANTABLE DEVICE | Status: FUNCTIONAL

## 2024-07-15 RX ORDER — SODIUM CHLORIDE 0.9 % (FLUSH) 0.9 %
5-40 SYRINGE (ML) INJECTION EVERY 12 HOURS SCHEDULED
Status: DISCONTINUED | OUTPATIENT
Start: 2024-07-15 | End: 2024-07-15 | Stop reason: HOSPADM

## 2024-07-15 RX ORDER — GABAPENTIN 100 MG/1
100 CAPSULE ORAL ONCE
Status: COMPLETED | OUTPATIENT
Start: 2024-07-15 | End: 2024-07-15

## 2024-07-15 RX ORDER — FENTANYL CITRATE 0.05 MG/ML
25 INJECTION, SOLUTION INTRAMUSCULAR; INTRAVENOUS EVERY 5 MIN PRN
Status: DISCONTINUED | OUTPATIENT
Start: 2024-07-15 | End: 2024-07-15 | Stop reason: HOSPADM

## 2024-07-15 RX ORDER — EPHEDRINE SULFATE/0.9% NACL/PF 25 MG/5 ML
SYRINGE (ML) INTRAVENOUS PRN
Status: DISCONTINUED | OUTPATIENT
Start: 2024-07-15 | End: 2024-07-15 | Stop reason: SDUPTHER

## 2024-07-15 RX ORDER — PROPOFOL 10 MG/ML
INJECTION, EMULSION INTRAVENOUS PRN
Status: DISCONTINUED | OUTPATIENT
Start: 2024-07-15 | End: 2024-07-15 | Stop reason: SDUPTHER

## 2024-07-15 RX ORDER — METOCLOPRAMIDE HYDROCHLORIDE 5 MG/ML
10 INJECTION INTRAMUSCULAR; INTRAVENOUS
Status: DISCONTINUED | OUTPATIENT
Start: 2024-07-15 | End: 2024-07-15 | Stop reason: HOSPADM

## 2024-07-15 RX ORDER — ACETAMINOPHEN 500 MG
500 TABLET ORAL EVERY 6 HOURS PRN
Qty: 60 TABLET | Refills: 0 | Status: SHIPPED | OUTPATIENT
Start: 2024-07-15

## 2024-07-15 RX ORDER — SODIUM CHLORIDE 9 MG/ML
INJECTION, SOLUTION INTRAVENOUS PRN
Status: DISCONTINUED | OUTPATIENT
Start: 2024-07-15 | End: 2024-07-15 | Stop reason: HOSPADM

## 2024-07-15 RX ORDER — MEPERIDINE HYDROCHLORIDE 25 MG/ML
12.5 INJECTION INTRAMUSCULAR; INTRAVENOUS; SUBCUTANEOUS EVERY 5 MIN PRN
Status: DISCONTINUED | OUTPATIENT
Start: 2024-07-15 | End: 2024-07-15 | Stop reason: HOSPADM

## 2024-07-15 RX ORDER — ONDANSETRON 2 MG/ML
INJECTION INTRAMUSCULAR; INTRAVENOUS PRN
Status: DISCONTINUED | OUTPATIENT
Start: 2024-07-15 | End: 2024-07-15 | Stop reason: SDUPTHER

## 2024-07-15 RX ORDER — BUPIVACAINE HYDROCHLORIDE AND EPINEPHRINE 5; 5 MG/ML; UG/ML
INJECTION, SOLUTION EPIDURAL; INTRACAUDAL; PERINEURAL PRN
Status: DISCONTINUED | OUTPATIENT
Start: 2024-07-15 | End: 2024-07-15 | Stop reason: ALTCHOICE

## 2024-07-15 RX ORDER — INDOCYANINE GREEN AND WATER 25 MG
5 KIT INJECTION ONCE
Status: COMPLETED | OUTPATIENT
Start: 2024-07-15 | End: 2024-07-15

## 2024-07-15 RX ORDER — SODIUM CHLORIDE 0.9 % (FLUSH) 0.9 %
5-40 SYRINGE (ML) INJECTION PRN
Status: DISCONTINUED | OUTPATIENT
Start: 2024-07-15 | End: 2024-07-15 | Stop reason: HOSPADM

## 2024-07-15 RX ORDER — OXYCODONE HYDROCHLORIDE AND ACETAMINOPHEN 5; 325 MG/1; MG/1
1 TABLET ORAL EVERY 8 HOURS PRN
Qty: 20 TABLET | Refills: 0 | Status: SHIPPED | OUTPATIENT
Start: 2024-07-15 | End: 2024-07-22

## 2024-07-15 RX ORDER — NALOXONE HYDROCHLORIDE 0.4 MG/ML
INJECTION, SOLUTION INTRAMUSCULAR; INTRAVENOUS; SUBCUTANEOUS PRN
Status: DISCONTINUED | OUTPATIENT
Start: 2024-07-15 | End: 2024-07-15 | Stop reason: HOSPADM

## 2024-07-15 RX ORDER — ACETAMINOPHEN 500 MG
1000 TABLET ORAL ONCE
Status: COMPLETED | OUTPATIENT
Start: 2024-07-15 | End: 2024-07-15

## 2024-07-15 RX ORDER — OXYCODONE HYDROCHLORIDE AND ACETAMINOPHEN 5; 325 MG/1; MG/1
1 TABLET ORAL EVERY 4 HOURS PRN
Status: DISCONTINUED | OUTPATIENT
Start: 2024-07-15 | End: 2024-07-15 | Stop reason: HOSPADM

## 2024-07-15 RX ORDER — FENTANYL CITRATE 50 UG/ML
INJECTION, SOLUTION INTRAMUSCULAR; INTRAVENOUS PRN
Status: DISCONTINUED | OUTPATIENT
Start: 2024-07-15 | End: 2024-07-15 | Stop reason: SDUPTHER

## 2024-07-15 RX ORDER — HYDRALAZINE HYDROCHLORIDE 20 MG/ML
10 INJECTION INTRAMUSCULAR; INTRAVENOUS
Status: DISCONTINUED | OUTPATIENT
Start: 2024-07-15 | End: 2024-07-15 | Stop reason: HOSPADM

## 2024-07-15 RX ORDER — ROCURONIUM BROMIDE 10 MG/ML
INJECTION, SOLUTION INTRAVENOUS PRN
Status: DISCONTINUED | OUTPATIENT
Start: 2024-07-15 | End: 2024-07-15 | Stop reason: SDUPTHER

## 2024-07-15 RX ORDER — LABETALOL HYDROCHLORIDE 5 MG/ML
10 INJECTION, SOLUTION INTRAVENOUS
Status: DISCONTINUED | OUTPATIENT
Start: 2024-07-15 | End: 2024-07-15 | Stop reason: HOSPADM

## 2024-07-15 RX ORDER — SODIUM CHLORIDE, SODIUM LACTATE, POTASSIUM CHLORIDE, CALCIUM CHLORIDE 600; 310; 30; 20 MG/100ML; MG/100ML; MG/100ML; MG/100ML
INJECTION, SOLUTION INTRAVENOUS CONTINUOUS
Status: DISCONTINUED | OUTPATIENT
Start: 2024-07-15 | End: 2024-07-15 | Stop reason: HOSPADM

## 2024-07-15 RX ORDER — ONDANSETRON 2 MG/ML
4 INJECTION INTRAMUSCULAR; INTRAVENOUS
Status: DISCONTINUED | OUTPATIENT
Start: 2024-07-15 | End: 2024-07-15 | Stop reason: HOSPADM

## 2024-07-15 RX ORDER — DEXAMETHASONE SODIUM PHOSPHATE 4 MG/ML
INJECTION, SOLUTION INTRA-ARTICULAR; INTRALESIONAL; INTRAMUSCULAR; INTRAVENOUS; SOFT TISSUE PRN
Status: DISCONTINUED | OUTPATIENT
Start: 2024-07-15 | End: 2024-07-15 | Stop reason: SDUPTHER

## 2024-07-15 RX ORDER — ACETAMINOPHEN 325 MG/1
650 TABLET ORAL
Status: DISCONTINUED | OUTPATIENT
Start: 2024-07-15 | End: 2024-07-15 | Stop reason: HOSPADM

## 2024-07-15 RX ORDER — DIPHENHYDRAMINE HYDROCHLORIDE 50 MG/ML
12.5 INJECTION INTRAMUSCULAR; INTRAVENOUS
Status: DISCONTINUED | OUTPATIENT
Start: 2024-07-15 | End: 2024-07-15 | Stop reason: HOSPADM

## 2024-07-15 RX ORDER — ONDANSETRON 4 MG/1
4 TABLET, ORALLY DISINTEGRATING ORAL 3 TIMES DAILY PRN
Qty: 21 TABLET | Refills: 0 | Status: SHIPPED | OUTPATIENT
Start: 2024-07-15

## 2024-07-15 RX ORDER — DOCUSATE SODIUM 100 MG/1
100 CAPSULE, LIQUID FILLED ORAL 2 TIMES DAILY
Qty: 60 CAPSULE | Refills: 0 | Status: SHIPPED | OUTPATIENT
Start: 2024-07-15 | End: 2024-08-14

## 2024-07-15 RX ORDER — LIDOCAINE HYDROCHLORIDE 10 MG/ML
INJECTION, SOLUTION EPIDURAL; INFILTRATION; INTRACAUDAL; PERINEURAL PRN
Status: DISCONTINUED | OUTPATIENT
Start: 2024-07-15 | End: 2024-07-15 | Stop reason: SDUPTHER

## 2024-07-15 RX ORDER — MIDAZOLAM HYDROCHLORIDE 1 MG/ML
INJECTION INTRAMUSCULAR; INTRAVENOUS PRN
Status: DISCONTINUED | OUTPATIENT
Start: 2024-07-15 | End: 2024-07-15 | Stop reason: SDUPTHER

## 2024-07-15 RX ORDER — BUPIVACAINE HYDROCHLORIDE 2.5 MG/ML
INJECTION, SOLUTION EPIDURAL; INFILTRATION; INTRACAUDAL PRN
Status: DISCONTINUED | OUTPATIENT
Start: 2024-07-15 | End: 2024-07-15 | Stop reason: ALTCHOICE

## 2024-07-15 RX ORDER — LIDOCAINE HYDROCHLORIDE 10 MG/ML
1 INJECTION, SOLUTION EPIDURAL; INFILTRATION; INTRACAUDAL; PERINEURAL
Status: DISCONTINUED | OUTPATIENT
Start: 2024-07-15 | End: 2024-07-15 | Stop reason: HOSPADM

## 2024-07-15 RX ADMIN — LIDOCAINE HYDROCHLORIDE 50 MG: 10 INJECTION, SOLUTION EPIDURAL; INFILTRATION; INTRACAUDAL; PERINEURAL at 13:15

## 2024-07-15 RX ADMIN — EPHEDRINE SULFATE 10 MG: 5 INJECTION INTRAVENOUS at 13:42

## 2024-07-15 RX ADMIN — ROCURONIUM BROMIDE 40 MG: 10 INJECTION, SOLUTION INTRAVENOUS at 13:15

## 2024-07-15 RX ADMIN — DEXAMETHASONE SODIUM PHOSPHATE 4 MG: 4 INJECTION, SOLUTION INTRAMUSCULAR; INTRAVENOUS at 13:35

## 2024-07-15 RX ADMIN — SODIUM CHLORIDE, POTASSIUM CHLORIDE, SODIUM LACTATE AND CALCIUM CHLORIDE: 600; 310; 30; 20 INJECTION, SOLUTION INTRAVENOUS at 11:21

## 2024-07-15 RX ADMIN — SUGAMMADEX 200 MG: 100 INJECTION, SOLUTION INTRAVENOUS at 14:23

## 2024-07-15 RX ADMIN — MIDAZOLAM 2 MG: 1 INJECTION INTRAMUSCULAR; INTRAVENOUS at 12:53

## 2024-07-15 RX ADMIN — FENTANYL CITRATE 50 MCG: 50 INJECTION, SOLUTION INTRAMUSCULAR; INTRAVENOUS at 13:15

## 2024-07-15 RX ADMIN — GABAPENTIN 100 MG: 100 CAPSULE ORAL at 11:29

## 2024-07-15 RX ADMIN — ONDANSETRON 4 MG: 2 INJECTION INTRAMUSCULAR; INTRAVENOUS at 14:09

## 2024-07-15 RX ADMIN — FENTANYL CITRATE 50 MCG: 50 INJECTION, SOLUTION INTRAMUSCULAR; INTRAVENOUS at 13:02

## 2024-07-15 RX ADMIN — FENTANYL CITRATE 50 MCG: 50 INJECTION, SOLUTION INTRAMUSCULAR; INTRAVENOUS at 13:49

## 2024-07-15 RX ADMIN — Medication 2000 MG: at 13:02

## 2024-07-15 RX ADMIN — PROPOFOL 100 MG: 10 INJECTION, EMULSION INTRAVENOUS at 13:15

## 2024-07-15 RX ADMIN — ROCURONIUM BROMIDE 10 MG: 10 INJECTION, SOLUTION INTRAVENOUS at 14:01

## 2024-07-15 RX ADMIN — PHENYLEPHRINE HYDROCHLORIDE 100 MCG: 10 INJECTION INTRAVENOUS at 13:40

## 2024-07-15 RX ADMIN — OXYCODONE HYDROCHLORIDE AND ACETAMINOPHEN 1 TABLET: 5; 325 TABLET ORAL at 15:50

## 2024-07-15 RX ADMIN — ACETAMINOPHEN 1000 MG: 500 TABLET ORAL at 11:29

## 2024-07-15 RX ADMIN — INDOCYANINE GREEN AND WATER 5 MG: KIT at 12:40

## 2024-07-15 ASSESSMENT — PAIN - FUNCTIONAL ASSESSMENT
PAIN_FUNCTIONAL_ASSESSMENT: 0-10
PAIN_FUNCTIONAL_ASSESSMENT: 0-10
PAIN_FUNCTIONAL_ASSESSMENT: CRITICAL CARE PAIN OBSERVATION TOOL (CPOT)

## 2024-07-15 ASSESSMENT — PAIN SCALES - GENERAL
PAINLEVEL_OUTOF10: 0
PAINLEVEL_OUTOF10: 8

## 2024-07-15 ASSESSMENT — PAIN DESCRIPTION - DESCRIPTORS: DESCRIPTORS: OTHER (COMMENT)

## 2024-07-15 ASSESSMENT — PAIN DESCRIPTION - LOCATION: LOCATION: ABDOMEN

## 2024-07-15 ASSESSMENT — PAIN DESCRIPTION - ORIENTATION: ORIENTATION: ANTERIOR;MID

## 2024-07-15 NOTE — ANESTHESIA PRE PROCEDURE
Department of Anesthesiology  Preprocedure Note       Name:  Evelyn Jerry   Age:  73 y.o.  :  1950                                          MRN:  [id number]         Date:  7/15/2024      Surgeon: Surgeon(s):  Hima Turcios IV, DO    Procedure: Procedure(s):  CHOLECYSTECTOMY LAPAROSCOPIC ROBOTIC XI    Medications prior to admission:   Prior to Admission medications    Medication Sig Start Date End Date Taking? Authorizing Provider   indomethacin (INDOCIN SR) 75 MG extended release capsule Take 1 capsule by mouth daily (with breakfast)    ProviderLove MD   ibuprofen (ADVIL;MOTRIN) 200 MG tablet Take 1 tablet by mouth every 6 hours as needed for Pain    ProviderLove MD   polyethylene glycol (MIRALAX) 17 GM/SCOOP powder Take 17 g by mouth daily as needed (constipation) 24   Alen Samuel APRN - CNP   vitamin D (ERGOCALCIFEROL) 1.25 MG (50367 UT) CAPS capsule Take 1 capsule by mouth once a week 24   Alen Samuel APRN - CNP   ferrous sulfate (IRON 325) 325 (65 Fe) MG tablet Take 1 tablet by mouth 2 times daily 24   Alen Samuel APRN - CNP   metFORMIN (GLUCOPHAGE) 500 MG tablet Take 1 tablet by mouth 2 times daily (with meals) 24   Alen Samuel APRN - CNP   amLODIPine (NORVASC) 5 MG tablet Take 1 tablet by mouth daily 24   Alen Samuel APRN - CNP   ondansetron (ZOFRAN-ODT) 4 MG disintegrating tablet Take 1 tablet by mouth 3 times daily as needed for Nausea or Vomiting 24   Alen Samuel APRN - CNP   Blood Pressure KIT Diagnosis: HTN. Needs to check blood pressure 1-2 times a day until stable, then once a day. Goal blood pressure less than 135/85, and above 110/60. 24   Alen Samuel APRN - CNP       Current medications:    Current Facility-Administered Medications   Medication Dose Route Frequency Provider Last Rate Last Admin   • sodium chloride flush 0.9 % injection 5-40 mL  5-40 mL IntraVENous 2 times per day Thiago Hinton MD       • sodium chloride

## 2024-07-15 NOTE — INTERVAL H&P NOTE
Update History & Physical    The patient's History and Physical of June 26, 2024 was reviewed with the patient and I examined the patient. There was no change. The surgical site was confirmed by the patient and me.  She is non English speaking  we used the  interpreters # [id number]. Pt undergoing for CHOLECYSTECTOMY LAPAROSCOPIC ROBOTIC XI per Dr. Turcios.   Pt denies fever/chills, chest pain or SOB  Pt NPO since the past midnight , pt took her NP medication today with sip of water  Pt denies taking any blood thinner medication   Denies hx of MRSA infection.  Denies hx of blood clots.  Denies hx of any personal or family hx of complications w/anesthesia. (She never have anesthesia before).  Physical exam remains unchanged including cardiac and pulmonary Assessment   See nursing flow sheet for vital sings    Lab Results   Component Value Date    WBC 9.1 06/19/2024    HGB 11.5 (L) 06/19/2024    HCT 35.0 (L) 06/19/2024    MCV 98.3 06/19/2024     06/19/2024     Lab Results   Component Value Date/Time     06/19/2024 09:34 AM    K 4.8 06/19/2024 09:34 AM     06/19/2024 09:34 AM    CO2 25 06/19/2024 09:34 AM    BUN 16 06/19/2024 09:34 AM    CREATININE 0.7 06/19/2024 09:34 AM    GLUCOSE 90 06/19/2024 09:34 AM    CALCIUM 8.6 06/19/2024 09:34 AM    LABGLOM >90 06/19/2024 09:34 AM        Electronically signed by EFREM Magallanes CNP on 7/15/2024 at 10:33 AM

## 2024-07-15 NOTE — DISCHARGE INSTRUCTIONS
of cloth between the ice bag and your skin.    Call your surgeon for the following:  You have pain that does not get better after you take pain medicine.   For an oral temperature (by mouth) is 101 degrees or higher, chills, or excessive sweating.  You have increasing and progressive bleeding or drainage from surgery site.  Signs of an infection:  increased swelling, redness, warmth, or hardness around surgery area or yellow or green drainage.  Persistent nausea or vomiting and can’t keep fluids down.  If you are unable to urinate within 8 hours of surgery.  Redness or swelling at IV site.  For any questions or concerns you may have.

## 2024-07-15 NOTE — OP NOTE
Operative Note      Patient: Evelyn Jerry  YOB: 1950  MRN: [id number]    Date of Procedure: 7/15/2024    Pre-Op Diagnosis Codes:     * Acute cholecystitis [K81.0]    Post-Op Diagnosis:  chronic cholecystitis       Procedure:  Laparoscopic robotic assisted cholecystectomy    Surgeon(s):  Hima Turcios IV, DO    Assistant:   Resident: Heather Paz DO    Anesthesia: General    Estimated Blood Loss (mL): Minimal    Complications: None    Specimens:   ID Type Source Tests Collected by Time Destination   A : GALLBLADDER Tissue Gallbladder SURGICAL PATHOLOGY Hima Turcios IV, DO 7/15/2024 1357        Implants:  Implant Name Type Inv. Item Serial No.  Lot No. LRB No. Used Action   CLIP INT L POLYMER MELANIE LIG HEM O MELANIE (6EA/PK) - [id number]  CLIP INT L POLYMER MELANIE LIG HEM O MELANIE (6EA/PK)  [employer] [id number] N/A 1 Implanted         Drains: * No LDAs found *    Findings:  Infection Present At Time Of Surgery (PATOS) (choose all levels that have infection present):  No infection present  Other Findings: distended gallbladder with large stone in the neck    Detailed Description of Procedure:   The patient was brought to the operative suite and placed in the supine position. A time out was made to verify correct patient, site of procedure and procedure type. A dose of antibiotics were given according to SCIP protocol. EPC cuffs were placed for DVT prophylaxis. A Herbert Hugger was placed to keep the patient euthermic. General anesthesia was given and the patient was intubated with an ET tube. The patient's abdomen was prepped and draped in sterile fashion.    Entrance was gained into the abdomen with a Veress needle placed at Art's point in the left upper quadrant.  A saline drop test was used to confirm entrance into the abdomen.  The abdomen was insufflated with carbon dioxide to a pressure of 15 mmHg. A 12 mm Optiview port was then placed into the left upper quadrant.  A

## 2024-07-15 NOTE — PROGRESS NOTES
1535  and son at bedside  Son will be   Short stay time ran smoothly  Discharge instructions reviewed with family

## 2024-07-15 NOTE — ANESTHESIA POSTPROCEDURE EVALUATION
Department of Anesthesiology  Postprocedure Note    Patient: Evelyn Jerry  MRN: [id number]  YOB: 1950  Date of evaluation: 7/15/2024    Procedure Summary       Date: 07/15/24 Room / Location: [address]    Anesthesia Start: 1247 Anesthesia Stop: 1433    Procedure: CHOLECYSTECTOMY LAPAROSCOPIC ROBOTIC XI (Abdomen) Diagnosis:       Acute cholecystitis      (Acute cholecystitis [K81.0])    Surgeons: Hima Turcios IV, DO Responsible Provider: Lasha Schafer MD    Anesthesia Type: general ASA Status: 2            Anesthesia Type: No value filed.    Jess Phase I: Jess Score: 9    Jess Phase II: Jess Score: 10    Anesthesia Post Evaluation    Comments: POST- ANESTHESIA EVALUATION       Pt Name: Evelyn Jerry  MRN: [id number]  YOB: 1950  Date of evaluation: 7/15/2024  Time:  5:16 PM      /64   Pulse 91   Temp 97 °F (36.1 °C) (Infrared)   Resp 16   Ht 1.524 m (5')   Wt 49.9 kg (110 lb)   SpO2 95%   BMI 21.48 kg/m²      Consciousness Level  Awake  Cardiopulmonary Status  Stable  Pain Adequately Treated YES  Nausea / Vomiting  NO  Adequate Hydration  YES  Anesthesia Related Complications NONE      Electronically signed by Lasha Schafer MD on 7/15/2024 at 5:16 PM           No notable events documented.

## 2024-07-17 LAB — SURGICAL PATHOLOGY REPORT: NORMAL

## 2024-07-23 DIAGNOSIS — I10 HYPERTENSION, UNSPECIFIED TYPE: ICD-10-CM

## 2024-07-24 RX ORDER — AMLODIPINE BESYLATE 5 MG/1
5 TABLET ORAL DAILY
Qty: 30 TABLET | Refills: 3 | Status: SHIPPED | OUTPATIENT
Start: 2024-07-24

## 2024-07-26 PROBLEM — Z01.818 PREOP EXAMINATION: Status: RESOLVED | Noted: 2024-06-26 | Resolved: 2024-07-26

## 2024-07-30 DIAGNOSIS — M45.9 ANKYLOSING SPONDYLITIS, UNSPECIFIED SITE OF SPINE (HCC): Primary | ICD-10-CM

## 2024-08-01 ENCOUNTER — OFFICE VISIT (OUTPATIENT)
Dept: UROLOGY | Age: 74
End: 2024-08-01
Payer: COMMERCIAL

## 2024-08-01 ENCOUNTER — TELEPHONE (OUTPATIENT)
Dept: UROLOGY | Age: 74
End: 2024-08-01

## 2024-08-01 VITALS
BODY MASS INDEX: 22.19 KG/M2 | WEIGHT: 113 LBS | HEIGHT: 60 IN | TEMPERATURE: 98.2 F | OXYGEN SATURATION: 98 % | DIASTOLIC BLOOD PRESSURE: 80 MMHG | HEART RATE: 90 BPM | SYSTOLIC BLOOD PRESSURE: 132 MMHG

## 2024-08-01 DIAGNOSIS — R31.0 GROSS HEMATURIA: Primary | ICD-10-CM

## 2024-08-01 PROCEDURE — 99204 OFFICE O/P NEW MOD 45 MIN: CPT | Performed by: UROLOGY

## 2024-08-01 PROCEDURE — 1123F ACP DISCUSS/DSCN MKR DOCD: CPT | Performed by: UROLOGY

## 2024-08-01 ASSESSMENT — ENCOUNTER SYMPTOMS
SHORTNESS OF BREATH: 0
GASTROINTESTINAL NEGATIVE: 1
EYES NEGATIVE: 1
WHEEZING: 0
EYE REDNESS: 0
ALLERGIC/IMMUNOLOGIC NEGATIVE: 1
RESPIRATORY NEGATIVE: 1
ABDOMINAL PAIN: 0
BACK PAIN: 0
COUGH: 0
EYE PAIN: 0
VOMITING: 0
NAUSEA: 0

## 2024-08-01 NOTE — PROGRESS NOTES
[facility]  [address]  [facility] [address]  Dept: [phone]    [facility] Urology Office Note - New Patient    Patient:  Evelyn Jerry  YOB: 1950  Date: 2024    The patient is a 74 y.o. female who presentstoday for evaluation of the following problems:   Chief Complaint   Patient presents with    Post-Op Check    Stone    referred by Alen Samuel APRN - CNP.    HPI  here for gross hematuria, intermittent for the last 3 years. comes from [place].  Hx of utis, no stone hx.  was a smoker many years ago.  She is having flank pain. Ct in  was neg for stones    (Patient's old records have been requested, reviewed and summarized in today's note.)    Summary of old records: N/A    History: N/A    ProceduresToday: N/A    Urinalysis today:  No results found for this visit on 24.    AUA Symptom Score (2024):                               Last BUN andcreatinine:  Lab Results   Component Value Date    BUN 16 2024     Lab Results   Component Value Date    CREATININE 0.7 2024       Additional Lab/Culture results: none    Reviewed during this Office Visit: none  (results were independently reviewed byphysician and radiology report verified)    PAST MEDICAL, FAMILY AND SOCIAL HISTORY:  Past Medical History:   Diagnosis Date    Anemia     Gallstones     Hypertension     Kidney stones     Pre-diabetes      Past Surgical History:   Procedure Laterality Date     SECTION      x 2    CHOLECYSTECTOMY N/A 7/15/2024    CHOLECYSTECTOMY LAPAROSCOPIC ROBOTIC XI performed by Hima Turcios IV, DO at [facility] OR     Family History   Problem Relation Age of Onset    Diabetes Mother      Outpatient Medications Marked as Taking for the 24 encounter (Office Visit) with Scott Moy MD   Medication Sig Dispense Refill    amLODIPine (NORVASC) 5 MG tablet Take 1 tablet by mouth daily 30 tablet 3

## 2024-08-01 NOTE — TELEPHONE ENCOUNTER
Cysto, (bilat) retrograde pyelogram @ [facility] 8/27/24 1:30pm   PAT **phone call** 8/13/24 9:30am          Spoke with patient and Dion( child) in office, procedure info given to patient.

## 2024-08-02 ENCOUNTER — TELEPHONE (OUTPATIENT)
Dept: FAMILY MEDICINE CLINIC | Age: 74
End: 2024-08-02

## 2024-08-02 ENCOUNTER — INITIAL CONSULT (OUTPATIENT)
Dept: ONCOLOGY | Age: 74
End: 2024-08-02
Payer: COMMERCIAL

## 2024-08-02 ENCOUNTER — HOSPITAL ENCOUNTER (OUTPATIENT)
Age: 74
Discharge: HOME | End: 2024-08-02
Payer: COMMERCIAL

## 2024-08-02 ENCOUNTER — TELEPHONE (OUTPATIENT)
Dept: ONCOLOGY | Age: 74
End: 2024-08-02

## 2024-08-02 VITALS
WEIGHT: 112 LBS | BODY MASS INDEX: 21.87 KG/M2 | TEMPERATURE: 98.7 F | DIASTOLIC BLOOD PRESSURE: 80 MMHG | HEART RATE: 105 BPM | SYSTOLIC BLOOD PRESSURE: 138 MMHG

## 2024-08-02 DIAGNOSIS — D64.9 NORMOCYTIC ANEMIA: Primary | ICD-10-CM

## 2024-08-02 DIAGNOSIS — R31.9 HEMATURIA, UNSPECIFIED TYPE: ICD-10-CM

## 2024-08-02 DIAGNOSIS — D64.9 NORMOCYTIC ANEMIA: ICD-10-CM

## 2024-08-02 LAB
ALBUMIN PERCENT: NORMAL %
ALBUMIN SERPL-MCNC: NORMAL G/DL
ALPHA 2 PERCENT: NORMAL %
ALPHA1 GLOB SERPL ELPH-MCNC: NORMAL %
ALPHA1 GLOB SERPL ELPH-MCNC: NORMAL G/DL
ALPHA2 GLOB SERPL ELPH-MCNC: NORMAL G/DL
B-GLOBULIN SERPL ELPH-MCNC: NORMAL %
B-GLOBULIN SERPL ELPH-MCNC: NORMAL G/DL
BASOPHILS # BLD: 0.11 K/UL (ref 0–0.2)
BASOPHILS NFR BLD: 1 % (ref 0–2)
EOSINOPHIL # BLD: 0.57 K/UL (ref 0–0.4)
EOSINOPHILS RELATIVE PERCENT: 5 % (ref 1–4)
ERYTHROCYTE [DISTWIDTH] IN BLOOD BY AUTOMATED COUNT: 14.6 % (ref 11.8–14.4)
FERRITIN SERPL-MCNC: 51 NG/ML (ref 13–150)
FOLATE SERPL-MCNC: 7 NG/ML (ref 4.8–24.2)
FREE KAPPA/LAMBDA RATIO: 1.48 (ref 0.22–1.74)
GAMMA GLOB SERPL ELPH-MCNC: NORMAL G/DL
GAMMA GLOBULIN %: NORMAL %
HAPTOGLOB SERPL-MCNC: 133 MG/DL (ref 30–200)
HCT VFR BLD AUTO: 38.4 % (ref 36.3–47.1)
HGB BLD-MCNC: 12.5 G/DL (ref 11.9–15.1)
IGA SERPL-MCNC: 787 MG/DL (ref 70–400)
IGG SERPL-MCNC: 2296 MG/DL (ref 700–1600)
IGM SERPL-MCNC: 398 MG/DL (ref 40–230)
IMM GRANULOCYTES # BLD AUTO: 0 K/UL (ref 0–0.3)
IMM GRANULOCYTES NFR BLD: 0 %
IRON SATN MFR SERPL: 18 % (ref 20–55)
IRON SERPL-MCNC: 44 UG/DL (ref 37–145)
KAPPA LC FREE SER-MCNC: 88.5 MG/L
LAMBDA LC FREE SERPL-MCNC: 59.9 MG/L (ref 4.2–27.7)
LDH SERPL-CCNC: 147 U/L (ref 135–214)
LYMPHOCYTES NFR BLD: 2.39 K/UL (ref 1–4.8)
LYMPHOCYTES RELATIVE PERCENT: 21 % (ref 24–44)
M PROTEIN 2 SERPL ELPH-MCNC: NORMAL G/DL
M PROTEIN SERPL ELPH-MCNC: NORMAL G/DL
MCH RBC QN AUTO: 32.1 PG (ref 25.2–33.5)
MCHC RBC AUTO-ENTMCNC: 32.6 G/DL (ref 28.4–34.8)
MCV RBC AUTO: 98.7 FL (ref 82.6–102.9)
MONOCYTES NFR BLD: 1.6 K/UL (ref 0.1–0.8)
MONOCYTES NFR BLD: 14 % (ref 1–7)
MORPHOLOGY: ABNORMAL
NEUTROPHILS NFR BLD: 59 % (ref 36–66)
NEUTS SEG NFR BLD: 6.73 K/UL (ref 1.8–7.7)
NRBC BLD-RTO: 0 PER 100 WBC
PATHOLOGIST: NORMAL
PLATELET # BLD AUTO: ABNORMAL K/UL (ref 138–453)
PLATELET, FLUORESCENCE: 203 K/UL (ref 138–453)
PLATELETS.RETICULATED NFR BLD AUTO: 4 % (ref 1.1–10.3)
PROT PATTERN SERPL ELPH-IMP: NORMAL
PROT SERPL-MCNC: 8.3 G/DL (ref 6.6–8.7)
RBC # BLD AUTO: 3.89 M/UL (ref 3.95–5.11)
RETICS # AUTO: 0.04 M/UL (ref 0.02–0.1)
RETICS/RBC NFR AUTO: 1 % (ref 0.5–2)
TIBC SERPL-MCNC: 241 UG/DL (ref 250–450)
TOTAL PROT. SUM,%: NORMAL %
TOTAL PROT. SUM: NORMAL G/DL
UNSATURATED IRON BINDING CAPACITY: 197 UG/DL (ref 112–347)
VIT B12 SERPL-MCNC: 799 PG/ML (ref 232–1245)
WBC OTHER # BLD: 11.4 K/UL (ref 3.5–11.3)

## 2024-08-02 PROCEDURE — 83521 IG LIGHT CHAINS FREE EACH: CPT

## 2024-08-02 PROCEDURE — 83550 IRON BINDING TEST: CPT

## 2024-08-02 PROCEDURE — 82746 ASSAY OF FOLIC ACID SERUM: CPT

## 2024-08-02 PROCEDURE — 83540 ASSAY OF IRON: CPT

## 2024-08-02 PROCEDURE — 83010 ASSAY OF HAPTOGLOBIN QUANT: CPT

## 2024-08-02 PROCEDURE — 84155 ASSAY OF PROTEIN SERUM: CPT

## 2024-08-02 PROCEDURE — 83615 LACTATE (LD) (LDH) ENZYME: CPT

## 2024-08-02 PROCEDURE — 85025 COMPLETE CBC W/AUTO DIFF WBC: CPT

## 2024-08-02 PROCEDURE — 99245 OFF/OP CONSLTJ NEW/EST HI 55: CPT | Performed by: INTERNAL MEDICINE

## 2024-08-02 PROCEDURE — 85055 RETICULATED PLATELET ASSAY: CPT

## 2024-08-02 PROCEDURE — 36415 COLL VENOUS BLD VENIPUNCTURE: CPT

## 2024-08-02 PROCEDURE — 82607 VITAMIN B-12: CPT

## 2024-08-02 PROCEDURE — 84165 PROTEIN E-PHORESIS SERUM: CPT

## 2024-08-02 PROCEDURE — 82728 ASSAY OF FERRITIN: CPT

## 2024-08-02 PROCEDURE — 85045 AUTOMATED RETICULOCYTE COUNT: CPT

## 2024-08-02 PROCEDURE — 82784 ASSAY IGA/IGD/IGG/IGM EACH: CPT

## 2024-08-02 NOTE — PROGRESS NOTES
Patient ID: Evelyn Jerry, 1950, [id number], 74 y.o.  Referred by :  Alen Samuel APRN - CNP   Reason for consultation: Normocytic anemia    Patient does not speak English and family translating      HISTORY OF PRESENT ILLNESS:    Oncologic History:    Evelyn Jerry is a very pleasant 74 y.o. female.  Who is immigrant from [place] has been in the country for 3 months had progressive fatigue weakness and CBC done on 2024 and did show hemoglobin at 11.5 with normal iron panel and low TIBC patient started on iron supplement  Did have hematuria and scheduled for cystoscopy also recent cholecystectomy    Past Medical History:   Diagnosis Date    Anemia     Gallstones     Hypertension     Kidney stones     Pre-diabetes        Past Surgical History:   Procedure Laterality Date     SECTION      x 2    CHOLECYSTECTOMY N/A 7/15/2024    CHOLECYSTECTOMY LAPAROSCOPIC ROBOTIC XI performed by Hima Turcios IV,  at [facility] OR       Allergies   Allergen Reactions    Ciprofloxacin Nausea And Vomiting       Current Outpatient Medications   Medication Sig Dispense Refill    amLODIPine (NORVASC) 5 MG tablet Take 1 tablet by mouth daily 30 tablet 3    ondansetron (ZOFRAN-ODT) 4 MG disintegrating tablet Take 1 tablet by mouth 3 times daily as needed for Nausea or Vomiting 21 tablet 0    docusate sodium (COLACE) 100 MG capsule Take 1 capsule by mouth 2 times daily 60 capsule 0    acetaminophen (TYLENOL) 500 MG tablet Take 1 tablet by mouth every 6 hours as needed for Pain 60 tablet 0    indomethacin (INDOCIN SR) 75 MG extended release capsule Take 1 capsule by mouth daily (with breakfast)      ibuprofen (ADVIL;MOTRIN) 200 MG tablet Take 1 tablet by mouth every 6 hours as needed for Pain      polyethylene glycol (MIRALAX) 17 GM/SCOOP powder Take 17 g by mouth daily as needed (constipation) 238 g 3    vitamin D (ERGOCALCIFEROL) 1.25 MG (38144 UT) CAPS capsule Take 1 capsule by mouth once a week 12 capsule 1

## 2024-08-02 NOTE — TELEPHONE ENCOUNTER
AVS from 8/2/24    Verbal from MD trivedi in 3-4 weeks     Rv on 8/30/24    Labs drawn today or tommorow     PT was given AVS and appt schedule    Electronically signed by Katlyn Clark on 8/2/2024 at 12:42 PM

## 2024-08-02 NOTE — TELEPHONE ENCOUNTER
Medical Clearance received via fax from: [facility]     Upcoming Surgery/Procedure: Cysto (Bilat) Retrograde Pyelogram     Scheduled on 08/27/2024 .    Pt requires need medical clearance prior to scheduled surgery/procedure .       Next Visit Date:  Visit date not found   Last Visit Date: 6/17/2024    Please see attachment below.                     Thank you!

## 2024-08-03 NOTE — TELEPHONE ENCOUNTER
Please print, will address once back in office. She had all the testing and we should be able to clear her!

## 2024-08-05 LAB
PATH REV BLD -IMP: NORMAL
SURGICAL PATHOLOGY REPORT: NORMAL

## 2024-08-06 LAB
ALBUMIN PERCENT: 45 % (ref 45–65)
ALBUMIN SERPL-MCNC: 3.7 G/DL (ref 3.2–5.2)
ALPHA 2 PERCENT: 8 % (ref 6–13)
ALPHA1 GLOB SERPL ELPH-MCNC: 0.3 G/DL (ref 0.1–0.4)
ALPHA1 GLOB SERPL ELPH-MCNC: 4 % (ref 3–6)
ALPHA2 GLOB SERPL ELPH-MCNC: 0.7 G/DL (ref 0.5–0.9)
B-GLOBULIN SERPL ELPH-MCNC: 1.2 G/DL (ref 0.5–1.1)
B-GLOBULIN SERPL ELPH-MCNC: 14 % (ref 11–19)
GAMMA GLOB SERPL ELPH-MCNC: 2.4 G/DL (ref 0.5–1.5)
GAMMA GLOBULIN %: 29 % (ref 9–20)
ITYP INTERPRETATION: NORMAL
PATH REV: NORMAL
PATHOLOGIST: ABNORMAL
PROT PATTERN SERPL ELPH-IMP: ABNORMAL
PROT SERPL-MCNC: 8.3 G/DL (ref 6.6–8.7)
TOTAL PROT. SUM,%: 100 % (ref 98–102)
TOTAL PROT. SUM: 8.3 G/DL (ref 6.3–8.2)

## 2024-08-13 ENCOUNTER — HOSPITAL ENCOUNTER (OUTPATIENT)
Dept: PREADMISSION TESTING | Age: 74
Discharge: HOME | End: 2024-08-17

## 2024-08-13 VITALS — WEIGHT: 110 LBS | BODY MASS INDEX: 20.77 KG/M2 | HEIGHT: 61 IN

## 2024-08-13 NOTE — PROGRESS NOTES
Evelyn's son Dion returns call.  service offered by writer, Dion denied. Writer instructed that if at anytime during PAT call that he would wish an  to let writer know and we will call [facility] to facilate.Dion voiced understanding.

## 2024-08-13 NOTE — PROGRESS NOTES
Pre-op Instructions For Out-Patient Endoscopy Surgery    Medication Instructions:  Please stop herbs and any supplements now (includes vitamins and minerals).    Please contact your surgeon and prescribing physician for pre-op instructions for any blood thinners. Ibuprofen and Indocen     If you have inhalers/aerosol treatments at home, please use them the morning of your surgery and bring the inhalers with you to the hospital.    Please take the following medications the morning of your surgery with a sip of water:    Amlodipine     Surgery Instructions:  After midnight before surgery:  Do not eat or drink anything, including water, mints, gum, and hard candy.  You may brush your teeth without swallowing.  No smoking, chewing tobacco, or street drugs.    Please shower or bathe before surgery.       Please do not wear any cologne, lotion, powder, jewelry, piercings, perfume, makeup, nail polish, hair accessories, or hair spray on the day of surgery.  Wear loose comfortable clothing.    Leave your valuables at home but bring a payment source for any after-surgery prescriptions you plan to fill at [facility].  Bring a storage case for any glasses/contacts.    An adult who is responsible for you MUST drive you home and should be with you for the first 24 hours after surgery.     The Day of Surgery:  Arrive at [facility] Surgery Entrance at the time directed by your surgeon and check in at the desk.  1145 am     If you have a living will or healthcare power of , please bring a copy.    You will be taken to the pre-op holding area where you will be prepared for surgery.  A physical assessment will be performed by a nurse practitioner or house officer.  Your IV will be started and you will meet your anesthesiologist.    When you go to surgery, your family will be directed to the surgical waiting room, where the doctor should speak with them after your surgery.    After surgery, you will

## 2024-08-19 RX ORDER — POLYETHYLENE GLYCOL 3350 17 G/17G
17 POWDER, FOR SOLUTION ORAL DAILY
Qty: 1 EACH | Refills: 1 | Status: CANCELLED | OUTPATIENT
Start: 2024-08-19 | End: 2024-09-18

## 2024-08-20 ENCOUNTER — OFFICE VISIT (OUTPATIENT)
Dept: GASTROENTEROLOGY | Age: 74
End: 2024-08-20
Payer: COMMERCIAL

## 2024-08-20 ENCOUNTER — TELEPHONE (OUTPATIENT)
Dept: FAMILY MEDICINE CLINIC | Age: 74
End: 2024-08-20

## 2024-08-20 VITALS
TEMPERATURE: 97.4 F | WEIGHT: 116 LBS | DIASTOLIC BLOOD PRESSURE: 86 MMHG | BODY MASS INDEX: 21.92 KG/M2 | SYSTOLIC BLOOD PRESSURE: 156 MMHG

## 2024-08-20 DIAGNOSIS — D50.9 IRON DEFICIENCY ANEMIA, UNSPECIFIED IRON DEFICIENCY ANEMIA TYPE: ICD-10-CM

## 2024-08-20 DIAGNOSIS — D50.9 IRON DEFICIENCY ANEMIA, UNSPECIFIED IRON DEFICIENCY ANEMIA TYPE: Primary | ICD-10-CM

## 2024-08-20 DIAGNOSIS — K59.04 CHRONIC IDIOPATHIC CONSTIPATION: ICD-10-CM

## 2024-08-20 PROCEDURE — 1123F ACP DISCUSS/DSCN MKR DOCD: CPT | Performed by: STUDENT IN AN ORGANIZED HEALTH CARE EDUCATION/TRAINING PROGRAM

## 2024-08-20 PROCEDURE — 99204 OFFICE O/P NEW MOD 45 MIN: CPT | Performed by: STUDENT IN AN ORGANIZED HEALTH CARE EDUCATION/TRAINING PROGRAM

## 2024-08-20 RX ORDER — FERROUS SULFATE 325(65) MG
325 TABLET ORAL 2 TIMES DAILY
Qty: 180 TABLET | Refills: 1 | Status: SHIPPED | OUTPATIENT
Start: 2024-08-20

## 2024-08-20 RX ORDER — WHEAT DEXTRIN 3 G/3.8 G
4 POWDER (GRAM) ORAL 2 TIMES DAILY
Qty: 1 EACH | Refills: 0 | Status: SHIPPED | OUTPATIENT
Start: 2024-08-20

## 2024-08-20 RX ORDER — ONDANSETRON 4 MG/1
4 TABLET, ORALLY DISINTEGRATING ORAL 3 TIMES DAILY PRN
Qty: 21 TABLET | Refills: 0 | Status: SHIPPED | OUTPATIENT
Start: 2024-08-20

## 2024-08-20 NOTE — PROGRESS NOTES
on file    Number of children: Not on file    Years of education: Not on file    Highest education level: Not on file   Occupational History    Not on file   Tobacco Use    Smoking status: Never    Smokeless tobacco: Never   Vaping Use    Vaping status: Never Used   Substance and Sexual Activity    Alcohol use: Never    Drug use: Never    Sexual activity: Not on file   Other Topics Concern    Not on file   Social History Narrative    Not on file     Social Determinants of Health     Financial Resource Strain: Low Risk  (6/17/2024)    Overall Financial Resource Strain (CARDIA)     Difficulty of Paying Living Expenses: Not hard at all   Food Insecurity: No Food Insecurity (6/17/2024)    Hunger Vital Sign     Worried About Running Out of Food in the Last Year: Never true     Ran Out of Food in the Last Year: Never true   Transportation Needs: Unknown (6/17/2024)    PRAPARE - Transportation     Lack of Transportation (Medical): Not on file     Lack of Transportation (Non-Medical): No   Physical Activity: Not on file   Stress: Not on file   Social Connections: Not on file   Intimate Partner Violence: Not on file   Housing Stability: Unknown (6/17/2024)    Housing Stability Vital Sign     Unable to Pay for Housing in the Last Year: Not on file     Number of Places Lived in the Last Year: Not on file     Unstable Housing in the Last Year: No       REVIEW OF SYSTEMS:     A 12-point review of systems was obtained and pertinent positives and negatives were listed below.   Constitutional: No fever, no chills, no lethargy, no weakness.  HEENT:  No headache, otalgia, itchy eyes, nasal discharge or sore throat.  Cardiac:  No chest pain, dyspnea, orthopnea or PND.  Chest:   No cough, phlegm or wheezing.  Abdomen:      Detailed by MA and HPI above  Neuro:  No focal weakness, abnormal movements or seizure like activity.  Skin:   No rashes, no itching.  :   No hematuria, no pyuria, no dysuria, no flank pain.  Extremities:  No  No

## 2024-08-20 NOTE — TELEPHONE ENCOUNTER
Please Approve or Refuse.  Send to Pharmacy per Pt's Request: [facility]     Next Visit Date:  pt will call back to   Last Visit Date: 6/17/2024    Hemoglobin A1C (%)   Date Value   06/19/2024 5.6             ( goal A1C is < 7)   BP Readings from Last 3 Encounters:   08/20/24 (!) 156/86   08/02/24 138/80   08/01/24 132/80          (goal 120/80)  BUN   Date Value Ref Range Status   06/19/2024 16 8 - 23 mg/dL Final     Creatinine   Date Value Ref Range Status   06/19/2024 0.7 0.5 - 0.9 mg/dL Final     Potassium   Date Value Ref Range Status   06/19/2024 4.8 3.7 - 5.3 mmol/L Final     Comment:     SPECIMEN MODERATELY HEMOLYZED, RESULTS MAY BE ADVERSELY AFFECTED

## 2024-08-20 NOTE — TELEPHONE ENCOUNTER
Please Approve or Refuse.  Send to Pharmacy per Pt's Request:      Next Visit Date:  Visit date not found   Last Visit Date: 6/17/2024    Hemoglobin A1C (%)   Date Value   06/19/2024 5.6             ( goal A1C is < 7)   BP Readings from Last 3 Encounters:   08/02/24 138/80   08/01/24 132/80   07/29/24 (!) 148/76          (goal 120/80)  BUN   Date Value Ref Range Status   06/19/2024 16 8 - 23 mg/dL Final     Creatinine   Date Value Ref Range Status   06/19/2024 0.7 0.5 - 0.9 mg/dL Final     Potassium   Date Value Ref Range Status   06/19/2024 4.8 3.7 - 5.3 mmol/L Final     Comment:     SPECIMEN MODERATELY HEMOLYZED, RESULTS MAY BE ADVERSELY AFFECTED

## 2024-08-21 ENCOUNTER — TELEPHONE (OUTPATIENT)
Dept: GASTROENTEROLOGY | Age: 74
End: 2024-08-21

## 2024-08-21 DIAGNOSIS — K59.04 CHRONIC IDIOPATHIC CONSTIPATION: Primary | ICD-10-CM

## 2024-08-21 RX ORDER — POLYETHYLENE GLYCOL 3350, SODIUM SULFATE ANHYDROUS, SODIUM BICARBONATE, SODIUM CHLORIDE, POTASSIUM CHLORIDE 236; 22.74; 6.74; 5.86; 2.97 G/4L; G/4L; G/4L; G/4L; G/4L
4 POWDER, FOR SOLUTION ORAL ONCE
Qty: 4000 ML | Refills: 0 | Status: SHIPPED | OUTPATIENT
Start: 2024-08-21 | End: 2024-08-21

## 2024-08-21 NOTE — TELEPHONE ENCOUNTER
Procedure scheduled/Dr Carson  Procedure: egd/colon   Dx: iron deficiency, anemia   Date: 9/4/24   Time: 12:45pm  Hospital: [facility] phone call: TBD   Bowel Prep instructions given: golytely 2CLD  In office/via phone: office   Clearance needed: none

## 2024-08-22 NOTE — TELEPHONE ENCOUNTER
Called patient her missed pat call tried to reach again LFT VM TO CALL THE OFFICE OR PAT...RJ

## 2024-08-23 NOTE — TELEPHONE ENCOUNTER
Patient rescheduled procedures.   Procedure scheduled/Dr Carson  Procedure: EGD/Colon  Dx:  Iron Deficiency anemia  Date:10/16/24  Time: 11:00am/arrive 9:00am  Hospital: [facility]  PAT phone call: JUAREZ  Bowel Prep instructions given: Vazquez  In office/via phone:  phone/mailed new bowel prep instructions  Clearance needed: none

## 2024-08-26 ENCOUNTER — ANESTHESIA EVENT (OUTPATIENT)
Dept: OPERATING ROOM | Age: 74
End: 2024-08-26
Payer: COMMERCIAL

## 2024-08-27 ENCOUNTER — ANESTHESIA (OUTPATIENT)
Dept: OPERATING ROOM | Age: 74
End: 2024-08-27
Payer: COMMERCIAL

## 2024-08-27 ENCOUNTER — APPOINTMENT (OUTPATIENT)
Dept: GENERAL RADIOLOGY | Age: 74
End: 2024-08-27
Attending: UROLOGY
Payer: COMMERCIAL

## 2024-08-27 ENCOUNTER — HOSPITAL ENCOUNTER (OUTPATIENT)
Age: 74
Setting detail: OUTPATIENT SURGERY
Discharge: HOME | End: 2024-08-27
Attending: UROLOGY | Admitting: UROLOGY
Payer: COMMERCIAL

## 2024-08-27 VITALS
TEMPERATURE: 97.7 F | BODY MASS INDEX: 22.09 KG/M2 | SYSTOLIC BLOOD PRESSURE: 138 MMHG | RESPIRATION RATE: 16 BRPM | HEIGHT: 61 IN | WEIGHT: 117 LBS | HEART RATE: 89 BPM | OXYGEN SATURATION: 98 % | DIASTOLIC BLOOD PRESSURE: 78 MMHG

## 2024-08-27 DIAGNOSIS — R31.0 GROSS HEMATURIA: ICD-10-CM

## 2024-08-27 DIAGNOSIS — D50.9 IRON DEFICIENCY ANEMIA, UNSPECIFIED IRON DEFICIENCY ANEMIA TYPE: ICD-10-CM

## 2024-08-27 LAB
GLUCOSE BLD-MCNC: 89 MG/DL (ref 65–105)
GLUCOSE BLD-MCNC: 94 MG/DL (ref 65–105)

## 2024-08-27 PROCEDURE — 2580000003 HC RX 258: Performed by: ANESTHESIOLOGY

## 2024-08-27 PROCEDURE — 3700000000 HC ANESTHESIA ATTENDED CARE: Performed by: UROLOGY

## 2024-08-27 PROCEDURE — 3700000001 HC ADD 15 MINUTES (ANESTHESIA): Performed by: UROLOGY

## 2024-08-27 PROCEDURE — 7100000000 HC PACU RECOVERY - FIRST 15 MIN: Performed by: UROLOGY

## 2024-08-27 PROCEDURE — 82947 ASSAY GLUCOSE BLOOD QUANT: CPT

## 2024-08-27 PROCEDURE — 6360000004 HC RX CONTRAST MEDICATION: Performed by: UROLOGY

## 2024-08-27 PROCEDURE — 88112 CYTOPATH CELL ENHANCE TECH: CPT

## 2024-08-27 PROCEDURE — 3600000012 HC SURGERY LEVEL 2 ADDTL 15MIN: Performed by: UROLOGY

## 2024-08-27 PROCEDURE — C1758 CATHETER, URETERAL: HCPCS | Performed by: UROLOGY

## 2024-08-27 PROCEDURE — 3600000002 HC SURGERY LEVEL 2 BASE: Performed by: UROLOGY

## 2024-08-27 PROCEDURE — 6360000002 HC RX W HCPCS: Performed by: NURSE ANESTHETIST, CERTIFIED REGISTERED

## 2024-08-27 PROCEDURE — 2709999900 HC NON-CHARGEABLE SUPPLY: Performed by: UROLOGY

## 2024-08-27 PROCEDURE — 6370000000 HC RX 637 (ALT 250 FOR IP): Performed by: UROLOGY

## 2024-08-27 PROCEDURE — 7100000001 HC PACU RECOVERY - ADDTL 15 MIN: Performed by: UROLOGY

## 2024-08-27 RX ORDER — LIDOCAINE HYDROCHLORIDE 10 MG/ML
1 INJECTION, SOLUTION EPIDURAL; INFILTRATION; INTRACAUDAL; PERINEURAL
Status: DISCONTINUED | OUTPATIENT
Start: 2024-08-27 | End: 2024-08-27 | Stop reason: HOSPADM

## 2024-08-27 RX ORDER — ONDANSETRON 2 MG/ML
INJECTION INTRAMUSCULAR; INTRAVENOUS PRN
Status: DISCONTINUED | OUTPATIENT
Start: 2024-08-27 | End: 2024-08-27 | Stop reason: SDUPTHER

## 2024-08-27 RX ORDER — PROPOFOL 10 MG/ML
INJECTION, EMULSION INTRAVENOUS PRN
Status: DISCONTINUED | OUTPATIENT
Start: 2024-08-27 | End: 2024-08-27 | Stop reason: SDUPTHER

## 2024-08-27 RX ORDER — DEXAMETHASONE SODIUM PHOSPHATE 4 MG/ML
INJECTION, SOLUTION INTRA-ARTICULAR; INTRALESIONAL; INTRAMUSCULAR; INTRAVENOUS; SOFT TISSUE PRN
Status: DISCONTINUED | OUTPATIENT
Start: 2024-08-27 | End: 2024-08-27 | Stop reason: SDUPTHER

## 2024-08-27 RX ORDER — CEFAZOLIN SODIUM 1 G/3ML
INJECTION, POWDER, FOR SOLUTION INTRAMUSCULAR; INTRAVENOUS PRN
Status: DISCONTINUED | OUTPATIENT
Start: 2024-08-27 | End: 2024-08-27 | Stop reason: SDUPTHER

## 2024-08-27 RX ORDER — IOPAMIDOL 510 MG/ML
INJECTION, SOLUTION INTRAVASCULAR PRN
Status: DISCONTINUED | OUTPATIENT
Start: 2024-08-27 | End: 2024-08-27 | Stop reason: ALTCHOICE

## 2024-08-27 RX ORDER — LIDOCAINE HYDROCHLORIDE 20 MG/ML
JELLY TOPICAL PRN
Status: DISCONTINUED | OUTPATIENT
Start: 2024-08-27 | End: 2024-08-27 | Stop reason: ALTCHOICE

## 2024-08-27 RX ORDER — SODIUM CHLORIDE 9 MG/ML
INJECTION, SOLUTION INTRAVENOUS PRN
Status: DISCONTINUED | OUTPATIENT
Start: 2024-08-27 | End: 2024-08-27 | Stop reason: HOSPADM

## 2024-08-27 RX ORDER — SODIUM CHLORIDE 0.9 % (FLUSH) 0.9 %
5-40 SYRINGE (ML) INJECTION PRN
Status: DISCONTINUED | OUTPATIENT
Start: 2024-08-27 | End: 2024-08-27 | Stop reason: HOSPADM

## 2024-08-27 RX ORDER — SODIUM CHLORIDE 0.9 % (FLUSH) 0.9 %
5-40 SYRINGE (ML) INJECTION EVERY 12 HOURS SCHEDULED
Status: DISCONTINUED | OUTPATIENT
Start: 2024-08-27 | End: 2024-08-27 | Stop reason: HOSPADM

## 2024-08-27 RX ORDER — SODIUM CHLORIDE, SODIUM LACTATE, POTASSIUM CHLORIDE, CALCIUM CHLORIDE 600; 310; 30; 20 MG/100ML; MG/100ML; MG/100ML; MG/100ML
INJECTION, SOLUTION INTRAVENOUS CONTINUOUS
Status: DISCONTINUED | OUTPATIENT
Start: 2024-08-27 | End: 2024-08-27 | Stop reason: HOSPADM

## 2024-08-27 RX ADMIN — PROPOFOL 100 MCG/KG/MIN: 10 INJECTION, EMULSION INTRAVENOUS at 13:53

## 2024-08-27 RX ADMIN — SODIUM CHLORIDE, POTASSIUM CHLORIDE, SODIUM LACTATE AND CALCIUM CHLORIDE: 600; 310; 30; 20 INJECTION, SOLUTION INTRAVENOUS at 12:40

## 2024-08-27 RX ADMIN — CEFAZOLIN 2 G: 1 INJECTION, POWDER, FOR SOLUTION INTRAMUSCULAR; INTRAVENOUS at 13:58

## 2024-08-27 RX ADMIN — ONDANSETRON 4 MG: 2 INJECTION INTRAMUSCULAR; INTRAVENOUS at 13:53

## 2024-08-27 RX ADMIN — PROPOFOL 50 MG: 10 INJECTION, EMULSION INTRAVENOUS at 13:52

## 2024-08-27 RX ADMIN — DEXAMETHASONE SODIUM PHOSPHATE 4 MG: 4 INJECTION INTRA-ARTICULAR; INTRALESIONAL; INTRAMUSCULAR; INTRAVENOUS; SOFT TISSUE at 13:53

## 2024-08-27 ASSESSMENT — PAIN - FUNCTIONAL ASSESSMENT
PAIN_FUNCTIONAL_ASSESSMENT: 0-10
PAIN_FUNCTIONAL_ASSESSMENT: 0-10

## 2024-08-27 ASSESSMENT — ENCOUNTER SYMPTOMS
SINUS PRESSURE: 0
ABDOMINAL PAIN: 0
SHORTNESS OF BREATH: 0
BACK PAIN: 1
NAUSEA: 0

## 2024-08-27 NOTE — BRIEF OP NOTE
Brief Postoperative Note      Patient: Evelyn Jerry  YOB: 1950  MRN: [id number]    Date of Procedure: 8/27/2024    Pre-Op Diagnosis Codes:      * Gross hematuria [R31.0]    Post-Op Diagnosis: Same       Procedure(s):  CYSTOSCOPY RETROGRADE PYELOGRAM BILATERAL    Surgeon(s):  Scott Moy MD    Assistant:  * No surgical staff found *    Anesthesia: Monitor Anesthesia Care    Estimated Blood Loss (mL): Minimal    Complications: None    Specimens:   * No specimens in log *    Implants:  * No implants in log *      Drains: * No LDAs found *    Findings:  Infection Present At Time Of Surgery (PATOS) (choose all levels that have infection present):  No infection present  Other Findings: d    Electronically signed by Scott Moy MD on 8/27/2024 at 12:34 PM

## 2024-08-27 NOTE — H&P
HISTORY and PHYSICAL  [facility]       NAME:  Evelyn Jerry  MRN: [id number]   YOB: 1950   Date: 8/27/2024   Age: 74 y.o.  Gender: female       COMPLAINT AND PRESENT HISTORY:       Evelyn Jerry is 74 y.o., female,is seen for : Pre-Op Diagnosis Codes:      * Gross hematuria     Patient will be having : Procedure(s):  CYSTOSCOPY RETROGRADE PYELOGRAM BILATERAL    Office note per Dr. Moy on  8/01/24, italicized below.  HPI  here for gross hematuria, intermittent for the last 3 years. comes from [place].  Hx of utis, no stone hx.  was a smoker many years ago.  She is having flank pain. Ct in June was neg for stones     UPDATE:     Patient is non English speaking and has signed for her Son to speak for her . Her  and son present.     Pt admits to urinary frequency and urgency. She states urine is very small in quantity.   Patient admits to having pain to the right groin. She reports more when she lying down in the bed.     Symptoms started 3 years ago.  Patient is not on any urologic meds.     Patient has had previous Cystoscopy done. Last one done  in July 2024.    Pt admit to intermittent  nausea related to back pain. ,  Denies any urinary symptoms including,  dysuria. Pt admits to hx of UTI .  No  fever or chills.    SIGNIFICANT MEDICAL HISTORY reviewed.     Patient has been NPO since midnight. No blood thinners in the past 5-7 days.( Ibuprofen ) pt took  Indocin lyesterday.    Pt took Amlodipine this am.     Patient had PONV, otherwise denies any personal or family problems with anesthesia.       RECENT LABS, IMAGING AND TESTING     Lab Results   Component Value Date    WBC 11.4 (H) 08/02/2024    RBC 3.89 (L) 08/02/2024    HGB 12.5 08/02/2024    HCT 38.4 08/02/2024    MCV 98.7 08/02/2024    MCH 32.1 08/02/2024    MCHC 32.6 08/02/2024    RDW 14.6 (H) 08/02/2024    PLT See Reflexed IPF Result 08/02/2024    MPV 9.6 06/19/2024        Lab Results   Component Value Date

## 2024-08-27 NOTE — ANESTHESIA POSTPROCEDURE EVALUATION
Department of Anesthesiology  Postprocedure Note    Patient: Evelyn Jerry  MRN: [id number]  YOB: 1950  Date of evaluation: 8/27/2024    Procedure Summary       Date: 08/27/24 Room / Location: [place] [address] / [facility]    Anesthesia Start: 1345 Anesthesia Stop: 1413    Procedure: CYSTOSCOPY RETROGRADE PYELOGRAM BILATERAL (Bilateral: Ureter) Diagnosis:       Gross hematuria      (Gross hematuria [R31.0])    Surgeons: cSott Moy MD Responsible Provider: Maya Pacheco MD    Anesthesia Type: general ASA Status: 3            Anesthesia Type: No value filed.    Jess Phase I: Jess Score: 8    Jess Phase II:      Anesthesia Post Evaluation    Comments: POST- ANESTHESIA EVALUATION       Pt Name: Evelyn Jerry  MRN: [id number]  YOB: 1950  Date of evaluation: 8/27/2024  Time:  3:29 PM      /78   Pulse 89   Temp 97.7 °F (36.5 °C) (Infrared)   Resp 16   Ht 1.549 m (5' 1\")   Wt 53.1 kg (117 lb)   SpO2 98%   BMI 22.11 kg/m²      Consciousness Level  Awake  Cardiopulmonary Status  Stable  Pain Adequately Treated YES  Nausea / Vomiting  NO  Adequate Hydration  YES  Anesthesia Related Complications NONE      Electronically signed by Maya Pacheco MD on 8/27/2024 at 3:29 PM      No notable events documented.

## 2024-08-27 NOTE — DISCHARGE INSTRUCTIONS
Cistoscopia: Qué esperar en el hogar  Cystoscopy: What to Expect at Home  Alvarado recuperación     Ivana cistoscopia es un procedimiento que le permite al médico observar el interior de la vejiga y la uretra. La uretra es el conducto que transporta la orina desde la vejiga hacia el exterior del cuerpo. El médico utiliza un instrumento ireland con santos, llamado cistoscopio. La vejiga se llena con líquido. [place] expande la vejiga de modo que el médico pueda observar alvarado interior detalladamente.  Después de la cistoscopia, podría tener la uretra adolorida al principio y podría sentir ardor al orinar che los primeros días después del procedimiento. Es posible que tenga la necesidad de orinar con más frecuencia, y la orina podría ser de color rosáceo. Estos síntomas deberían mejorar en 1 o 2 días. Probablemente pueda retomar la mayoría de kiara actividades habituales después de 1 o 2 días.  Esta hoja de cuidados le da ivana idea general del tiempo que le llevará recuperarse. Sin embargo, cada persona se recupera a un ritmo diferente. Siga los pasos que se mencionan a continuación para recuperarse lo más rápido posible.  ¿Cómo puede cuidarse en el hogar?  Actividad    Descanse cuando se sienta cansado. Dormir lo suficiente le ayudará a recuperarse.     Trate de caminar todos los días. Comience caminando un poco más de lo que caminó el día anterior. Poco a poco, aumente la distancia que camina. Caminar aumenta el flujo de chacorta y ayuda a prevenir la neumonía y el estreñimiento.     Evite las actividades intensas, rosalia montar en bicicleta, trotar, levantar pesas o hacer ejercicio aeróbico, hasta que alvarado médico lo apruebe.     Pregúntele a alvarado médico cuándo puede volver a conducir.     La mayoría de las personas pueden volver al trabajo 1 o 2 días después del procedimiento.     Puede ducharse y bañarse igual que siempre.     Pregúntele a alvarado médico cuándo puede tener relaciones sexuales.   Alimentación    Puede consumir alvarado dieta  encontrar más información?  Vaya a https://faviankb.Alg[employer].net/patientedes e escriba F569 para más información sobre \"Aprenda sobre la administración de sedantes (incluida la anestesia monitoreada).\"  Revisado: 24 junio, 2023  Versión del contenido: 14.1  © 1677-9264 [employer].   Las instrucciones de cuidado fueron adaptadas bajo licencia por [employer]. Si usted tiene preguntas sobre ivana afección médica o sobre estas instrucciones, siempre pregunte a alvarado profesional de [place]. [employer] niega toda garantía o responsabilidad por alvarado uso de esta información.

## 2024-08-27 NOTE — ANESTHESIA PRE PROCEDURE
Department of Anesthesiology  Preprocedure Note       Name:  Evelyn Jerry   Age:  74 y.o.  :  1950                                          MRN:  [id number]         Date:  2024      Surgeon: Surgeon(s):  Scott Myo MD    Procedure: Procedure(s):  CYSTOSCOPY RETROGRADE PYELOGRAM BILATERAL    Medications prior to admission:   Prior to Admission medications    Medication Sig Start Date End Date Taking? Authorizing Provider   Wheat Dextrin (BENEFIBER) POWD Take 4 g by mouth in the morning and at bedtime 24  Yes Seth Carson MD   ferrous sulfate (IRON 325) 325 (65 Fe) MG tablet Take 1 tablet by mouth 2 times daily 24  Yes Alen Samuel APRN - CNP   ondansetron (ZOFRAN-ODT) 4 MG disintegrating tablet Take 1 tablet by mouth 3 times daily as needed for Nausea or Vomiting 24  Yes Alen Samuel APRN - CNP   amLODIPine (NORVASC) 5 MG tablet Take 1 tablet by mouth daily 24  Yes Alen Samuel APRN - CNP   acetaminophen (TYLENOL) 500 MG tablet Take 1 tablet by mouth every 6 hours as needed for Pain 7/15/24  Yes Heather Paz DO   indomethacin (INDOCIN SR) 75 MG extended release capsule Take 1 capsule by mouth daily (with breakfast)   Yes Love Morales MD   ibuprofen (ADVIL;MOTRIN) 200 MG tablet Take 1 tablet by mouth every 6 hours as needed for Pain   Yes ProviderLove MD   polyethylene glycol (MIRALAX) 17 GM/SCOOP powder Take 17 g by mouth daily as needed (constipation) 24  Yes Alen Samuel APRN - CNP   metFORMIN (GLUCOPHAGE) 500 MG tablet Take 1 tablet by mouth 2 times daily (with meals) 24  Yes Alen Smauel APRN - CNP   vitamin D (ERGOCALCIFEROL) 1.25 MG (72007 UT) CAPS capsule Take 1 capsule by mouth once a week 24   Alen Samuel APRN - CNP   Blood Pressure KIT Diagnosis: HTN. Needs to check blood pressure 1-2 times a day until stable, then once a day. Goal blood pressure less than 135/85, and above 110/60. 24   Alen Samuel APRN - CNP

## 2024-08-28 LAB
CASE NUMBER:: NORMAL
SPECIMEN DESCRIPTION: NORMAL
SURGICAL PATHOLOGY REPORT: NORMAL

## 2024-08-28 RX ORDER — FERROUS SULFATE 325(65) MG
325 TABLET ORAL 2 TIMES DAILY
Qty: 180 TABLET | Refills: 1 | Status: SHIPPED | OUTPATIENT
Start: 2024-08-28

## 2024-08-28 NOTE — OP NOTE
[id number] [place] [address]                            OPERATIVE REPORT      PATIENT NAME: DAVID MAO                : 1950  MED REC NO: [id number]                          ROOM: [facility]  ACCOUNT NO: [id number]                       ADMIT DATE: 2024  PROVIDER: Scott Moy MD      DATE OF PROCEDURE:  2024    SURGEON:  Scott Moy MD    ASSISTANT:  None.    PREOPERATIVE DIAGNOSIS:  Hematuria.    POSTOPERATIVE DIAGNOSIS:  Hematuria.    PROCEDURES:  Cystoscopy, bilateral retrograde pyelogram.    ANESTHESIA:  MAC.    COMPLICATIONS:  None.    ESTIMATED BLOOD LOSS:  Minimal.    INDICATIONS FOR PROCEDURE:  This is a 74-year-old  female who has a history of hematuria.  Given this, the above procedure was scheduled to evaluate.  All the risks and benefits were explained to the patient.  Informed consent was obtained.    DESCRIPTION OF PROCEDURE:  This 74-year-old  female was brought back to the operating room, positioned in a modified dorsal lithotomy position after MAC anesthesia was started.  She was sterilely prepped and draped in standard fashion.  A 22-[place] rigid cystoscope was inserted into her urethra and advanced into her bladder.  Bladder was examined.  No tumors or stones were noted.  Both ureteral orifices were normal.  I advanced a cone-tipped catheter and shot a right retrograde pyelogram with contrast.  No filling defects were seen.  All calyces were sharp.  No points were of obstruction were seen.  The same was done on the left side with cone-tipped catheter and contrast.  No filling defects were seen.  All the calyces were sharp.  No points of obstruction were seen.  I collected urine for cytology.  I drained the bladder and removed the scope, and that was the end of the procedure.  The patient will be discharged home.  She will follow up with me in 6 weeks.  I was present and scrubbed

## 2024-08-28 NOTE — TELEPHONE ENCOUNTER
Please Approve or Refuse.  Send to Pharmacy per Pt's Request: [facility]     Next Visit Date:  CALLED PT LVM  Last Visit Date: 6/17/2024    Hemoglobin A1C (%)   Date Value   06/19/2024 5.6             ( goal A1C is < 7)   BP Readings from Last 3 Encounters:   08/27/24 138/78   08/20/24 (!) 156/86   08/02/24 138/80          (goal 120/80)  BUN   Date Value Ref Range Status   06/19/2024 16 8 - 23 mg/dL Final     Creatinine   Date Value Ref Range Status   06/19/2024 0.7 0.5 - 0.9 mg/dL Final     Potassium   Date Value Ref Range Status   06/19/2024 4.8 3.7 - 5.3 mmol/L Final     Comment:     SPECIMEN MODERATELY HEMOLYZED, RESULTS MAY BE ADVERSELY AFFECTED

## 2024-08-30 ENCOUNTER — TELEPHONE (OUTPATIENT)
Dept: ONCOLOGY | Age: 74
End: 2024-08-30

## 2024-08-30 ENCOUNTER — OFFICE VISIT (OUTPATIENT)
Dept: ONCOLOGY | Age: 74
End: 2024-08-30
Payer: COMMERCIAL

## 2024-08-30 VITALS
BODY MASS INDEX: 21.65 KG/M2 | DIASTOLIC BLOOD PRESSURE: 79 MMHG | WEIGHT: 114.6 LBS | TEMPERATURE: 97.7 F | HEART RATE: 92 BPM | SYSTOLIC BLOOD PRESSURE: 145 MMHG

## 2024-08-30 DIAGNOSIS — D50.0 IRON DEFICIENCY ANEMIA DUE TO CHRONIC BLOOD LOSS: Primary | ICD-10-CM

## 2024-08-30 DIAGNOSIS — D89.0 POLYCLONAL GAMMOPATHY: ICD-10-CM

## 2024-08-30 PROBLEM — D50.9 IRON DEFICIENCY ANEMIA: Status: ACTIVE | Noted: 2024-08-30

## 2024-08-30 PROCEDURE — 99214 OFFICE O/P EST MOD 30 MIN: CPT | Performed by: INTERNAL MEDICINE

## 2024-08-30 PROCEDURE — 1123F ACP DISCUSS/DSCN MKR DOCD: CPT | Performed by: INTERNAL MEDICINE

## 2024-08-30 NOTE — PROGRESS NOTES
Patient ID: Evelyn Jerry, 1950, [id number], 74 y.o.  Referred by :  No ref. provider found   Reason for consultation: Normocytic anemia    Patient does not speak English and family translating      HISTORY OF PRESENT ILLNESS:    Oncologic History:    Evelyn Jerry is a very pleasant 74 y.o. female.  Who is immigrant from [place] has been in the country for 3 months had progressive fatigue weakness and CBC done on 2024 and did show hemoglobin at 11.5 with normal iron panel and low TIBC patient started on iron supplement  Did have hematuria and scheduled for cystoscopy also recent cholecystectomy which was done and no abnormality    Interval history  Status post cystoscopy with no evidence of malignancy  On iron supplement and iron panel and hemoglobin up to normal  No hematuria    Past Medical History:   Diagnosis Date    Anemia     Gallstones     Hypertension     Kidney stones     PONV (postoperative nausea and vomiting)     Pre-diabetes        Past Surgical History:   Procedure Laterality Date     SECTION      x 2    CHOLECYSTECTOMY N/A 7/15/2024    CHOLECYSTECTOMY LAPAROSCOPIC ROBOTIC XI performed by Hima Turcios IV, DO at [facility] OR    CYSTOSCOPY Bilateral 2024    CYSTOSCOPY RETROGRADE PYELOGRAM BILATERAL performed by Scott Moy MD at [facility] OR       Allergies   Allergen Reactions    Ciprofloxacin Nausea And Vomiting       Current Outpatient Medications   Medication Sig Dispense Refill    ferrous sulfate (IRON 325) 325 (65 Fe) MG tablet Take 1 tablet by mouth 2 times daily 180 tablet 1    Wheat Dextrin (BENEFIBER) POWD Take 4 g by mouth in the morning and at bedtime 1 each 0    ondansetron (ZOFRAN-ODT) 4 MG disintegrating tablet Take 1 tablet by mouth 3 times daily as needed for Nausea or Vomiting 21 tablet 0    amLODIPine (NORVASC) 5 MG tablet Take 1 tablet by mouth daily 30 tablet 3    acetaminophen (TYLENOL) 500 MG tablet Take 1 tablet by mouth every 6 hours as needed for  Connections: Not on file   Intimate Partner Violence: Not on file   Housing Stability: Unknown (6/17/2024)    Housing Stability Vital Sign     Unable to Pay for Housing in the Last Year: Not on file     Number of Places Lived in the Last Year: Not on file     Unstable Housing in the Last Year: No       Family History   Problem Relation Age of Onset    Diabetes Mother         REVIEW OF SYSTEM:     Constitutional: No fever or chills. No night sweats, no weight loss   Eyes: No eye discharge, double vision, or eye pain   HEENT: negative for sore mouth, sore throat, hoarseness and voice change   Respiratory: negative for cough , sputum, dyspnea, wheezing, hemoptysis, chest pain   Cardiovascular: negative for chest pain, dyspnea, palpitations, orthopnea, PND   Gastrointestinal: negative for nausea, vomiting, diarrhea, constipation, abdominal pain, Dysphagia, hematemesis and hematochezia   Genitourinary: negative for frequency, dysuria, nocturia, urinary incontinence, and hematuria   Integument: negative for rash, skin lesions, bruises.   Hematologic/Lymphatic: negative for easy bruising, bleeding, lymphadenopathy, petechiae and swelling/edema   Endocrine: negative for heat or cold intolerance, tremor, weight changes, change in bowel habits and hair loss   Musculoskeletal: negative for myalgias, arthralgias, pain, joint swelling,and bone pain   Neurological: negative for headaches, dizziness, seizures, weakness, numbness       OBJECTIVE:         Vitals:    08/30/24 0849   BP: (!) 145/79   Pulse: 92   Temp: 97.7 °F (36.5 °C)       PHYSICAL EXAM:   General appearance - well appearing, no in pain or distress   Mental status - alert and cooperative   Eyes - pupils equal and reactive, extraocular eye movements intact   Ears - bilateral TM's and external ear canals normal   Mouth - mucous membranes moist, pharynx normal without lesions   Neck - supple, no significant adenopathy   Lymphatics - no palpable lymphadenopathy, no

## 2024-08-30 NOTE — TELEPHONE ENCOUNTER
AVS from 8/30/24    Rtc in 3 months with labs    Exámenes ordenados hoy  Ferritin  Complete esto alrededor de 8/30/2024.  Iron and TIBC  Complete esto alrededor de 8/30/2024.  CBC with Auto Differential  Complete esto alrededor de 9/6/2024.  Comprehensive Metabolic Panel  Complete esto alrededor de 9/6/2024.      Rv on 11/22/24    Labs drawn week prior     PT was given AVS and appt schedule    Electronically signed by Katlyn Clark on 8/30/2024 at 9:05 AM

## 2024-09-05 DIAGNOSIS — R73.03 PREDIABETES: ICD-10-CM

## 2024-09-05 DIAGNOSIS — I10 HYPERTENSION, UNSPECIFIED TYPE: ICD-10-CM

## 2024-09-05 DIAGNOSIS — K59.04 CHRONIC IDIOPATHIC CONSTIPATION: ICD-10-CM

## 2024-09-05 DIAGNOSIS — K59.00 CONSTIPATION, UNSPECIFIED CONSTIPATION TYPE: ICD-10-CM

## 2024-09-06 DIAGNOSIS — E55.9 VITAMIN D DEFICIENCY: ICD-10-CM

## 2024-09-06 RX ORDER — ONDANSETRON 4 MG/1
4 TABLET, ORALLY DISINTEGRATING ORAL 3 TIMES DAILY PRN
Qty: 21 TABLET | Refills: 0 | Status: SHIPPED | OUTPATIENT
Start: 2024-09-06

## 2024-09-06 RX ORDER — WHEAT DEXTRIN 3 G/3.8 G
4 POWDER (GRAM) ORAL 2 TIMES DAILY
Qty: 1 EACH | Refills: 0 | Status: SHIPPED | OUTPATIENT
Start: 2024-09-06

## 2024-09-06 RX ORDER — AMLODIPINE BESYLATE 5 MG/1
5 TABLET ORAL DAILY
Qty: 30 TABLET | Refills: 3 | Status: SHIPPED | OUTPATIENT
Start: 2024-09-06

## 2024-09-06 RX ORDER — POLYETHYLENE GLYCOL 3350 17 G/17G
17 POWDER, FOR SOLUTION ORAL DAILY PRN
Qty: 238 G | Refills: 3 | Status: SHIPPED | OUTPATIENT
Start: 2024-09-06

## 2024-09-06 NOTE — TELEPHONE ENCOUNTER
Please Approve or Refuse.  Send to Pharmacy per Pt's Request:      Next Visit Date:  Visit date not found   Last Visit Date: 6/17/2024    Hemoglobin A1C (%)   Date Value   06/19/2024 5.6             ( goal A1C is < 7)   BP Readings from Last 3 Encounters:   08/30/24 (!) 145/79   08/27/24 138/78   08/20/24 (!) 156/86          (goal 120/80)  BUN   Date Value Ref Range Status   06/19/2024 16 8 - 23 mg/dL Final     Creatinine   Date Value Ref Range Status   06/19/2024 0.7 0.5 - 0.9 mg/dL Final     Potassium   Date Value Ref Range Status   06/19/2024 4.8 3.7 - 5.3 mmol/L Final     Comment:     SPECIMEN MODERATELY HEMOLYZED, RESULTS MAY BE ADVERSELY AFFECTED

## 2024-09-06 NOTE — TELEPHONE ENCOUNTER
Please Approve or Refuse.  Send to Pharmacy per Pt's Request: [email or web address]     Next Visit Date:  called pt lvm  Last Visit Date: 6/17/2024    Hemoglobin A1C (%)   Date Value   06/19/2024 5.6             ( goal A1C is < 7)   BP Readings from Last 3 Encounters:   08/30/24 (!) 145/79   08/27/24 138/78   08/20/24 (!) 156/86          (goal 120/80)  BUN   Date Value Ref Range Status   06/19/2024 16 8 - 23 mg/dL Final     Creatinine   Date Value Ref Range Status   06/19/2024 0.7 0.5 - 0.9 mg/dL Final     Potassium   Date Value Ref Range Status   06/19/2024 4.8 3.7 - 5.3 mmol/L Final     Comment:     SPECIMEN MODERATELY HEMOLYZED, RESULTS MAY BE ADVERSELY AFFECTED

## 2024-09-08 RX ORDER — ERGOCALCIFEROL 1.25 MG/1
50000 CAPSULE, LIQUID FILLED ORAL WEEKLY
Qty: 12 CAPSULE | Refills: 1 | Status: SHIPPED | OUTPATIENT
Start: 2024-09-08

## 2024-09-17 DIAGNOSIS — K59.04 CHRONIC IDIOPATHIC CONSTIPATION: ICD-10-CM

## 2024-09-17 RX ORDER — WHEAT DEXTRIN 3 G/3.8 G
4 POWDER (GRAM) ORAL 2 TIMES DAILY
Qty: 1 EACH | Refills: 0 | Status: SHIPPED | OUTPATIENT
Start: 2024-09-17

## 2024-09-24 ENCOUNTER — HOSPITAL ENCOUNTER (OUTPATIENT)
Age: 74
Discharge: HOME | End: 2024-09-24
Payer: COMMERCIAL

## 2024-09-24 DIAGNOSIS — D50.0 IRON DEFICIENCY ANEMIA DUE TO CHRONIC BLOOD LOSS: ICD-10-CM

## 2024-09-24 DIAGNOSIS — D89.0 POLYCLONAL GAMMOPATHY: ICD-10-CM

## 2024-09-24 LAB
ALBUMIN SERPL-MCNC: 3.7 G/DL (ref 3.5–5.2)
ALP SERPL-CCNC: 94 U/L (ref 35–104)
ALT SERPL-CCNC: 17 U/L (ref 5–33)
ANION GAP SERPL CALCULATED.3IONS-SCNC: 10 MMOL/L (ref 9–17)
AST SERPL-CCNC: 18 U/L
BASOPHILS # BLD: 0 K/UL (ref 0–0.2)
BASOPHILS NFR BLD: 0 % (ref 0–2)
BILIRUB SERPL-MCNC: 0.4 MG/DL (ref 0.3–1.2)
BUN SERPL-MCNC: 27 MG/DL (ref 8–23)
CALCIUM SERPL-MCNC: 9.2 MG/DL (ref 8.6–10.4)
CHLORIDE SERPL-SCNC: 101 MMOL/L (ref 98–107)
CO2 SERPL-SCNC: 26 MMOL/L (ref 20–31)
CREAT SERPL-MCNC: 1 MG/DL (ref 0.5–0.9)
EOSINOPHIL # BLD: 0.38 K/UL (ref 0–0.4)
EOSINOPHILS RELATIVE PERCENT: 3 % (ref 0–4)
ERYTHROCYTE [DISTWIDTH] IN BLOOD BY AUTOMATED COUNT: 13.8 % (ref 11.5–14.9)
FERRITIN SERPL-MCNC: 77 NG/ML (ref 13–150)
GFR, ESTIMATED: 59 ML/MIN/1.73M2
GLUCOSE SERPL-MCNC: 102 MG/DL (ref 70–99)
HCT VFR BLD AUTO: 44.1 % (ref 36–46)
HGB BLD-MCNC: 14.7 G/DL (ref 12–16)
IGA SERPL-MCNC: 785 MG/DL (ref 70–400)
IGG SERPL-MCNC: 2167 MG/DL (ref 700–1600)
IGM SERPL-MCNC: 394 MG/DL (ref 40–230)
IRON SATN MFR SERPL: 25 % (ref 20–55)
IRON SERPL-MCNC: 65 UG/DL (ref 37–145)
LYMPHOCYTES NFR BLD: 1.91 K/UL (ref 1–4.8)
LYMPHOCYTES RELATIVE PERCENT: 15 % (ref 24–44)
MCH RBC QN AUTO: 32.5 PG (ref 26–34)
MCHC RBC AUTO-ENTMCNC: 33.3 G/DL (ref 31–37)
MCV RBC AUTO: 97.6 FL (ref 80–100)
MONOCYTES NFR BLD: 1.52 K/UL (ref 0.1–1.3)
MONOCYTES NFR BLD: 12 % (ref 1–7)
MORPHOLOGY: NORMAL
NEUTROPHILS NFR BLD: 70 % (ref 36–66)
NEUTS SEG NFR BLD: 8.89 K/UL (ref 1.3–9.1)
PLATELET # BLD AUTO: 174 K/UL (ref 150–450)
PMV BLD AUTO: 10.8 FL (ref 6–12)
POTASSIUM SERPL-SCNC: 4.6 MMOL/L (ref 3.7–5.3)
PROT SERPL-MCNC: 8.6 G/DL (ref 6.4–8.3)
RBC # BLD AUTO: 4.52 M/UL (ref 4–5.2)
SODIUM SERPL-SCNC: 137 MMOL/L (ref 135–144)
TIBC SERPL-MCNC: 260 UG/DL (ref 250–450)
UNSATURATED IRON BINDING CAPACITY: 195 UG/DL (ref 112–347)
WBC OTHER # BLD: 12.7 K/UL (ref 3.5–11)

## 2024-09-24 PROCEDURE — 83550 IRON BINDING TEST: CPT

## 2024-09-24 PROCEDURE — 82728 ASSAY OF FERRITIN: CPT

## 2024-09-24 PROCEDURE — 82784 ASSAY IGA/IGD/IGG/IGM EACH: CPT

## 2024-09-24 PROCEDURE — 85025 COMPLETE CBC W/AUTO DIFF WBC: CPT

## 2024-09-24 PROCEDURE — 36415 COLL VENOUS BLD VENIPUNCTURE: CPT

## 2024-09-24 PROCEDURE — 80053 COMPREHEN METABOLIC PANEL: CPT

## 2024-09-24 PROCEDURE — 83540 ASSAY OF IRON: CPT

## 2024-10-13 DIAGNOSIS — K59.04 CHRONIC IDIOPATHIC CONSTIPATION: ICD-10-CM

## 2024-10-13 DIAGNOSIS — D50.9 IRON DEFICIENCY ANEMIA, UNSPECIFIED IRON DEFICIENCY ANEMIA TYPE: ICD-10-CM

## 2024-10-13 DIAGNOSIS — E55.9 VITAMIN D DEFICIENCY: ICD-10-CM

## 2024-10-13 DIAGNOSIS — R73.03 PREDIABETES: ICD-10-CM

## 2024-10-13 DIAGNOSIS — I10 HYPERTENSION, UNSPECIFIED TYPE: ICD-10-CM

## 2024-10-13 DIAGNOSIS — K59.00 CONSTIPATION, UNSPECIFIED CONSTIPATION TYPE: ICD-10-CM

## 2024-10-13 NOTE — TELEPHONE ENCOUNTER
Please Approve or Refuse.  Send to Pharmacy per Pt's Request:      Next Visit Date:  Visit date not found   Last Visit Date: 6/17/2024    Hemoglobin A1C (%)   Date Value   06/19/2024 5.6             ( goal A1C is < 7)   BP Readings from Last 3 Encounters:   08/30/24 (!) 145/79   08/27/24 138/78   08/20/24 (!) 156/86          (goal 120/80)  BUN   Date Value Ref Range Status   09/24/2024 27 (H) 8 - 23 mg/dL Final     Creatinine   Date Value Ref Range Status   09/24/2024 1.0 (H) 0.5 - 0.9 mg/dL Final     Potassium   Date Value Ref Range Status   09/24/2024 4.6 3.7 - 5.3 mmol/L Final     Comment:     SPECIMEN MODERATELY HEMOLYZED, RESULTS MAY BE ADVERSELY AFFECTED

## 2024-10-14 RX ORDER — AMLODIPINE BESYLATE 5 MG/1
5 TABLET ORAL DAILY
Qty: 30 TABLET | Refills: 3 | Status: SHIPPED | OUTPATIENT
Start: 2024-10-14

## 2024-10-14 RX ORDER — ERGOCALCIFEROL 1.25 MG/1
50000 CAPSULE, LIQUID FILLED ORAL WEEKLY
Qty: 12 CAPSULE | Refills: 1 | Status: SHIPPED | OUTPATIENT
Start: 2024-10-14

## 2024-10-14 RX ORDER — FERROUS SULFATE 325(65) MG
325 TABLET ORAL 2 TIMES DAILY
Qty: 180 TABLET | Refills: 1 | Status: SHIPPED | OUTPATIENT
Start: 2024-10-14

## 2024-10-14 RX ORDER — WHEAT DEXTRIN 3 G/3.8 G
4 POWDER (GRAM) ORAL 2 TIMES DAILY
Qty: 1 EACH | Refills: 0 | Status: SHIPPED | OUTPATIENT
Start: 2024-10-14

## 2024-10-14 RX ORDER — ONDANSETRON 4 MG/1
4 TABLET, ORALLY DISINTEGRATING ORAL 3 TIMES DAILY PRN
Qty: 21 TABLET | Refills: 0 | Status: SHIPPED | OUTPATIENT
Start: 2024-10-14

## 2024-10-14 RX ORDER — POLYETHYLENE GLYCOL 3350 17 G/17G
17 POWDER, FOR SOLUTION ORAL DAILY PRN
Qty: 238 G | Refills: 3 | Status: SHIPPED | OUTPATIENT
Start: 2024-10-14

## 2024-10-14 NOTE — PRE-PROCEDURE INSTRUCTIONS
No answer, left message ?                             Unable to leave message ?WRONG NUMBER    When were you told to arrive at hospital ?      Do you have a  ?    Are you on any blood thinners ?                     If yes when did you stop taking ?    Do you have your prep Rx filled and instruction ?      Nothing to eat the day before , only clear liquids.    Are you experiencing any covid symptoms ?     Do you have any infections or rash we should be aware of ?      Do you have the Hibiclens soap to use the night before and the morning of surgery ?    Nothing to eat or drink after midnight, only a sip of water to take any medication instructed to take the night before.  Wear comfortable clothing, leave any valuables at home, remove any jewelry and body piercing .

## 2024-10-15 ENCOUNTER — ANESTHESIA EVENT (OUTPATIENT)
Dept: ENDOSCOPY | Age: 74
End: 2024-10-15
Payer: COMMERCIAL

## 2024-10-16 ENCOUNTER — ANESTHESIA (OUTPATIENT)
Dept: ENDOSCOPY | Age: 74
End: 2024-10-16
Payer: COMMERCIAL

## 2024-10-16 ENCOUNTER — HOSPITAL ENCOUNTER (OUTPATIENT)
Age: 74
Setting detail: OUTPATIENT SURGERY
Discharge: HOME | End: 2024-10-16
Attending: STUDENT IN AN ORGANIZED HEALTH CARE EDUCATION/TRAINING PROGRAM | Admitting: STUDENT IN AN ORGANIZED HEALTH CARE EDUCATION/TRAINING PROGRAM
Payer: COMMERCIAL

## 2024-10-16 VITALS
RESPIRATION RATE: 14 BRPM | BODY MASS INDEX: 21.71 KG/M2 | HEIGHT: 61 IN | DIASTOLIC BLOOD PRESSURE: 67 MMHG | SYSTOLIC BLOOD PRESSURE: 137 MMHG | OXYGEN SATURATION: 100 % | WEIGHT: 115 LBS | HEART RATE: 83 BPM | TEMPERATURE: 97.7 F

## 2024-10-16 DIAGNOSIS — D50.9 IRON DEFICIENCY ANEMIA, UNSPECIFIED IRON DEFICIENCY ANEMIA TYPE: ICD-10-CM

## 2024-10-16 PROBLEM — K22.9 IRREGULAR Z LINE OF ESOPHAGUS: Status: ACTIVE | Noted: 2024-10-16

## 2024-10-16 PROBLEM — K55.20 AVM (ARTERIOVENOUS MALFORMATION) OF COLON: Status: ACTIVE | Noted: 2024-10-16

## 2024-10-16 PROBLEM — K92.1 MELENA: Status: ACTIVE | Noted: 2024-10-16

## 2024-10-16 PROBLEM — K31.819 AVM (ARTERIOVENOUS MALFORMATION) OF STOMACH, ACQUIRED: Status: ACTIVE | Noted: 2024-10-16

## 2024-10-16 PROBLEM — K64.8 INTERNAL HEMORRHOID: Status: ACTIVE | Noted: 2024-10-16

## 2024-10-16 PROBLEM — K29.40 ATROPHIC GASTRITIS WITHOUT HEMORRHAGE: Status: ACTIVE | Noted: 2024-10-16

## 2024-10-16 PROBLEM — K63.5 POLYP OF SIGMOID COLON: Status: ACTIVE | Noted: 2024-10-16

## 2024-10-16 LAB — GLUCOSE BLD-MCNC: 112 MG/DL (ref 65–105)

## 2024-10-16 PROCEDURE — 3700000001 HC ADD 15 MINUTES (ANESTHESIA): Performed by: STUDENT IN AN ORGANIZED HEALTH CARE EDUCATION/TRAINING PROGRAM

## 2024-10-16 PROCEDURE — 7100000010 HC PHASE II RECOVERY - FIRST 15 MIN: Performed by: STUDENT IN AN ORGANIZED HEALTH CARE EDUCATION/TRAINING PROGRAM

## 2024-10-16 PROCEDURE — 3609012400 HC EGD TRANSORAL BIOPSY SINGLE/MULTIPLE: Performed by: STUDENT IN AN ORGANIZED HEALTH CARE EDUCATION/TRAINING PROGRAM

## 2024-10-16 PROCEDURE — 88342 IMHCHEM/IMCYTCHM 1ST ANTB: CPT

## 2024-10-16 PROCEDURE — 7100000011 HC PHASE II RECOVERY - ADDTL 15 MIN: Performed by: STUDENT IN AN ORGANIZED HEALTH CARE EDUCATION/TRAINING PROGRAM

## 2024-10-16 PROCEDURE — 2709999900 HC NON-CHARGEABLE SUPPLY: Performed by: STUDENT IN AN ORGANIZED HEALTH CARE EDUCATION/TRAINING PROGRAM

## 2024-10-16 PROCEDURE — 2500000003 HC RX 250 WO HCPCS: Performed by: NURSE ANESTHETIST, CERTIFIED REGISTERED

## 2024-10-16 PROCEDURE — 88305 TISSUE EXAM BY PATHOLOGIST: CPT

## 2024-10-16 PROCEDURE — 2580000003 HC RX 258: Performed by: ANESTHESIOLOGY

## 2024-10-16 PROCEDURE — 3700000000 HC ANESTHESIA ATTENDED CARE: Performed by: STUDENT IN AN ORGANIZED HEALTH CARE EDUCATION/TRAINING PROGRAM

## 2024-10-16 PROCEDURE — 2720000010 HC SURG SUPPLY STERILE: Performed by: STUDENT IN AN ORGANIZED HEALTH CARE EDUCATION/TRAINING PROGRAM

## 2024-10-16 PROCEDURE — 3609010300 HC COLONOSCOPY W/BIOPSY SINGLE/MULTIPLE: Performed by: STUDENT IN AN ORGANIZED HEALTH CARE EDUCATION/TRAINING PROGRAM

## 2024-10-16 PROCEDURE — 82947 ASSAY GLUCOSE BLOOD QUANT: CPT

## 2024-10-16 PROCEDURE — 6360000002 HC RX W HCPCS: Performed by: NURSE ANESTHETIST, CERTIFIED REGISTERED

## 2024-10-16 RX ORDER — SODIUM CHLORIDE 9 MG/ML
INJECTION, SOLUTION INTRAVENOUS PRN
Status: DISCONTINUED | OUTPATIENT
Start: 2024-10-16 | End: 2024-10-16 | Stop reason: HOSPADM

## 2024-10-16 RX ORDER — SODIUM CHLORIDE 0.9 % (FLUSH) 0.9 %
5-40 SYRINGE (ML) INJECTION PRN
Status: DISCONTINUED | OUTPATIENT
Start: 2024-10-16 | End: 2024-10-16 | Stop reason: HOSPADM

## 2024-10-16 RX ORDER — SODIUM CHLORIDE 0.9 % (FLUSH) 0.9 %
5-40 SYRINGE (ML) INJECTION EVERY 12 HOURS SCHEDULED
Status: DISCONTINUED | OUTPATIENT
Start: 2024-10-16 | End: 2024-10-16 | Stop reason: HOSPADM

## 2024-10-16 RX ORDER — PROPOFOL 10 MG/ML
INJECTION, EMULSION INTRAVENOUS
Status: DISCONTINUED | OUTPATIENT
Start: 2024-10-16 | End: 2024-10-16 | Stop reason: SDUPTHER

## 2024-10-16 RX ORDER — SODIUM CHLORIDE, SODIUM LACTATE, POTASSIUM CHLORIDE, CALCIUM CHLORIDE 600; 310; 30; 20 MG/100ML; MG/100ML; MG/100ML; MG/100ML
INJECTION, SOLUTION INTRAVENOUS CONTINUOUS
Status: DISCONTINUED | OUTPATIENT
Start: 2024-10-16 | End: 2024-10-16 | Stop reason: HOSPADM

## 2024-10-16 RX ORDER — PANTOPRAZOLE SODIUM 40 MG/1
40 TABLET, DELAYED RELEASE ORAL
Qty: 90 TABLET | Refills: 1 | Status: SHIPPED | OUTPATIENT
Start: 2024-10-16

## 2024-10-16 RX ORDER — LIDOCAINE HYDROCHLORIDE 10 MG/ML
1 INJECTION, SOLUTION EPIDURAL; INFILTRATION; INTRACAUDAL; PERINEURAL
Status: DISCONTINUED | OUTPATIENT
Start: 2024-10-16 | End: 2024-10-16 | Stop reason: HOSPADM

## 2024-10-16 RX ORDER — LIDOCAINE HYDROCHLORIDE 20 MG/ML
INJECTION, SOLUTION EPIDURAL; INFILTRATION; INTRACAUDAL; PERINEURAL
Status: DISCONTINUED | OUTPATIENT
Start: 2024-10-16 | End: 2024-10-16 | Stop reason: SDUPTHER

## 2024-10-16 RX ADMIN — SODIUM CHLORIDE, POTASSIUM CHLORIDE, SODIUM LACTATE AND CALCIUM CHLORIDE: 600; 310; 30; 20 INJECTION, SOLUTION INTRAVENOUS at 10:31

## 2024-10-16 RX ADMIN — LIDOCAINE HYDROCHLORIDE 60 MG: 20 INJECTION, SOLUTION EPIDURAL; INFILTRATION; INTRACAUDAL; PERINEURAL at 10:37

## 2024-10-16 RX ADMIN — PROPOFOL 70 MG: 10 INJECTION, EMULSION INTRAVENOUS at 10:37

## 2024-10-16 RX ADMIN — PROPOFOL 150 MCG/KG/MIN: 10 INJECTION, EMULSION INTRAVENOUS at 10:38

## 2024-10-16 ASSESSMENT — ENCOUNTER SYMPTOMS
TROUBLE SWALLOWING: 0
BACK PAIN: 0
SHORTNESS OF BREATH: 0
COUGH: 0
SORE THROAT: 0
APNEA: 0

## 2024-10-16 ASSESSMENT — PAIN - FUNCTIONAL ASSESSMENT
PAIN_FUNCTIONAL_ASSESSMENT: 0-10
PAIN_FUNCTIONAL_ASSESSMENT: NONE - DENIES PAIN

## 2024-10-16 NOTE — OP NOTE
Esophagogastroduodenoscopy    DATE OF PROCEDURE: 10/16/2024     SURGEON: Seth Carson MD  Facility: [facility]  ASSISTANT: None  Anesthesia: Monitored anesthesia care  PREOPERATIVE DIAGNOSIS: Anemia, melena    Diagnosis:    POSTOPERATIVE DIAGNOSIS: As described below (see findings and impression)    OPERATION: Upper GI endoscopy with Biopsy and control bleed    ANESTHESIA: Monitored Anesthesia Care (MAC)     ESTIMATED BLOOD LOSS: Less than 50 ml    COMPLICATIONS: None.     SPECIMENS:  Was Obtained:     ID Type Source Tests Collected by Time Destination   A : DUODENUM BIOPSIES Tissue Duodenum SURGICAL PATHOLOGY Seth Carson MD 10/16/2024 1038    B : STOMACH BIOPSIES R/O H. PYLORI & INTESTINAL METAPLASIA Tissue Stomach SURGICAL PATHOLOGY Seth Carson MD 10/16/2024 1038    C : GE JUNCTION BIOPSIES Tissue GE Junction Biopsy SURGICAL PATHOLOGY Seth Carson MD 10/16/2024 1043         HISTORY: The patient is a 74 y.o. year old female with history of above preop diagnosis.  I recommended esophagogastroduodenoscopy with possible biopsy and I explained the risk, benefits, expected outcome, and alternatives to the procedure.  Risks included but are not limited to bleeding, infection, respiratory distress, hypotension, and perforation of the esophagus, stomach, or duodenum.  Patient understands and is in agreement.      PROCEDURE: The patient was given IV Monitored Anesthesia Care (MAC) and vitals monitoring per anesthesia department.  The patient was placed in the left lateral decubitus position. The patient was monitored continuously with ECG tracing, pulse oximetry, blood pressure monitoring, and direct observation. The gastroscope was inserted into the mouth and advanced under direct vision to second portion of the duodenum.  A careful inspection was made as the gastroscope was withdrawn, including a retroflexed view of the proximal stomach; findings and interventions are described below.

## 2024-10-16 NOTE — ANESTHESIA POSTPROCEDURE EVALUATION
Department of Anesthesiology  Postprocedure Note    Patient: Evelyn Jerry  MRN: [id number]  YOB: 1950  Date of evaluation: 10/16/2024    Procedure Summary       Date: 10/16/24 Room / Location: [place] [address] / [facility]    Anesthesia Start: 1031 Anesthesia Stop: 1120    Procedures:       ESOPHAGOGASTRODUODENOSCOPY BIOPSY WITH APC CAUTERIZATION (Esophagus)      COLONOSCOPY POLYPECTOMY BIOPSY WITH CAUTERIZATION OF AVMS Diagnosis:       Iron deficiency anemia, unspecified iron deficiency anemia type      (Iron deficiency anemia, unspecified iron deficiency anemia type [D50.9])    Surgeons: Seth Carson MD Responsible Provider: Myaa Pacheco MD    Anesthesia Type: general, TIVA ASA Status: 3            Anesthesia Type: No value filed.    Jess Phase I: Jess Score: 10    Jess Phase II: Jess Score: 10    Anesthesia Post Evaluation    Comments: POST- ANESTHESIA EVALUATION       Pt Name: Evelyn Jerry  MRN: [id number]  YOB: 1950  Date of evaluation: 10/16/2024  Time:  1:14 PM      /67   Pulse 83   Temp 97.7 °F (36.5 °C)   Resp 14   Ht 1.549 m (5' 1\")   Wt 52.2 kg (115 lb)   SpO2 100%   BMI 21.73 kg/m²      Consciousness Level  Awake  Cardiopulmonary Status  Stable  Pain Adequately Treated YES  Nausea / Vomiting  NO  Adequate Hydration  YES  Anesthesia Related Complications NONE      Electronically signed by Maya Pacheco MD on 10/16/2024 at 1:14 PM      No notable events documented.

## 2024-10-16 NOTE — OP NOTE
COLONOSCOPY    DATE OF PROCEDURE: 10/16/2024    SURGEON: Seth Carson MD  Facility : [facility]  ASSISTANT: None  Anesthesia: Monitored anesthesia care  PREOPERATIVE DIAGNOSIS: Anemia, constipation, change in bowel habit    POSTOPERATIVE DIAGNOSIS: as described below    OPERATION: Total colonoscopy with biopsy and ablation of lesion    ANESTHESIA: Monitored Anesthesia Care (MAC)    ESTIMATED BLOOD LOSS: less than 50 cc    COMPLICATIONS: None.     SPECIMENS:  Was Obtained:     ID Type Source Tests Collected by Time Destination   A : DUODENUM BIOPSIES Tissue Duodenum SURGICAL PATHOLOGY Seth Carson MD 10/16/2024 1038    B : STOMACH BIOPSIES R/O H. PYLORI & INTESTINAL METAPLASIA Tissue Stomach SURGICAL PATHOLOGY Seth Carson MD 10/16/2024 1038    C : GE JUNCTION BIOPSIES Tissue GE Junction Biopsy SURGICAL PATHOLOGY Seth Carson MD 10/16/2024 1043    D : SIGMOID POLYP BIOPSY Tissue Sigmoid Colon SURGICAL PATHOLOGY Seth Carson MD 10/16/2024 1112         HISTORY: The patient is a 74 y.o. year old female with history of above preop diagnosis.  I recommended colonoscopy with possible biopsy or polypectomy and I explained the risk, benefits, expected outcome, and alternatives to the procedure.  Risks included but are not limited to medication allergy, medication reaction, cardiovascular and respiratory problems, bleeding, perforation, infection, and/or missed diagnosis.  The patient understands and is in agreement.        PROCEDURE: Following arrival in the endoscopy room, the patient was placed in the left lateral decubitus position and final time-out accomplished in the presence of the nursing staff. Baseline vital signs were obtained and reviewed. The patient was given IV Monitored Anesthesia Care (MAC) and vitals monitoring per anesthesia department.     Digital rectal exam- normal    The colonoscope was inserted per rectum and advanced under direct vision to the cecum without

## 2024-10-16 NOTE — ANESTHESIA PRE PROCEDURE
kg (115 lb)   08/30/24 52 kg (114 lb 9.6 oz)   08/27/24 53.1 kg (117 lb)     There is no height or weight on file to calculate BMI.    CBC:   Lab Results   Component Value Date/Time    WBC 12.7 09/24/2024 08:28 AM    RBC 4.52 09/24/2024 08:28 AM    HGB 14.7 09/24/2024 08:28 AM    HCT 44.1 09/24/2024 08:28 AM    MCV 97.6 09/24/2024 08:28 AM    RDW 13.8 09/24/2024 08:28 AM     09/24/2024 08:28 AM       CMP:   Lab Results   Component Value Date/Time     09/24/2024 08:28 AM    K 4.6 09/24/2024 08:28 AM     09/24/2024 08:28 AM    CO2 26 09/24/2024 08:28 AM    BUN 27 09/24/2024 08:28 AM    CREATININE 1.0 09/24/2024 08:28 AM    GFRAA 90.3 09/03/2024 11:07 AM    LABGLOM 59 09/24/2024 08:28 AM    GLUCOSE 102 09/24/2024 08:28 AM    GLUCOSE 107 09/03/2024 11:07 AM    CALCIUM 9.2 09/24/2024 08:28 AM    BILITOT 0.4 09/24/2024 08:28 AM    ALKPHOS 94 09/24/2024 08:28 AM    AST 18 09/24/2024 08:28 AM    ALT 17 09/24/2024 08:28 AM       POC Tests: No results for input(s): \"POCGLU\", \"POCNA\", \"POCK\", \"POCCL\", \"POCBUN\", \"POCHEMO\", \"POCHCT\" in the last 72 hours.    Coags: No results found for: \"PROTIME\", \"INR\", \"APTT\"    HCG (If Applicable): No results found for: \"PREGTESTUR\", \"PREGSERUM\", \"HCG\", \"HCGQUANT\"     ABGs: No results found for: \"PHART\", \"PO2ART\", \"VSL3XYX\", \"WTH8KWW\", \"BEART\", \"C8ZQHLYA\"     Type & Screen (If Applicable):  No results found for: \"LABABO\"    Drug/Infectious Status (If Applicable):  Lab Results   Component Value Date/Time    HEPCAB NEG 09/03/2024 11:07 AM       COVID-19 Screening (If Applicable): No results found for: \"COVID19\"        Anesthesia Evaluation  Patient summary reviewed and Nursing notes reviewed   history of anesthetic complications: PONV.  Airway: Mallampati: II  TM distance: >3 FB   Neck ROM: limited  Mouth opening: > = 3 FB   Dental:    (+) edentulous      Pulmonary:Negative Pulmonary ROS and normal exam  breath sounds clear to auscultation

## 2024-10-16 NOTE — PROGRESS NOTES
Pt Family wanted [id number] but prescriber sent to [id number] - [place], [place] - [address] [phone]

## 2024-10-16 NOTE — H&P
HISTORY and PHYSICAL  [facility]       NAME:  Evelyn Jerry  MRN: [id number]   YOB: 1950   Date: 10/16/2024   Age: 74 y.o.  Gender: female       COMPLAINT AND PRESENT HISTORY:     Evelyn Jerry is 74 y.o.,  female, presents for ESOPHAGOGASTRODUODENOSCOPY BIOPSY, COLONOSCOPY DIAGNOSTIC   Primary dx: Iron deficiency anemia, unspecified iron deficiency anemia type [D50.9].    Office note per Dr Carson on 8/20/2024  HISTORY OF PRESENT ILLNESS: Ms.Rosa Jerry is a 74 y.o. female accompanied by her son who says that she is a recent immigrant from [place] has been in the country for 3 months had progressive fatigue weakness and CBC done on June 19, 2024 and did show hemoglobin at 11.5 with normal iron panel and low TIBC patient started on iron supplement     She does complain of abdominal distention, bloating, and growling.  She also has some constipation.  No evidence of blood in stool.  She does have dark stool however she attributes it to iron pill.  She denies any abdominal pain.     Did have hematuria and scheduled for cystoscopy also recent cholecystectomy     Previous Endoscopies  None     Previous GI workup   CT  1. No kidney stones or obvious hydronephrosis. Probable anterior mid left  renal cyst, but assessment somewhat limited.  Consider follow-up ultrasound.  Fluid-filled bladder with mural prominence but without focal abnormality or  stones. Correlate with urinalysis for possible mild cystitis.  2. Hepatosplenomegaly.  3. Gallstones without CT evidence cholecystitis.  4. Redundant large bowel with moderate retained stool, but no bowel  obstruction.  Some fecalization small bowel.  5. Probable trace effusions, slightly larger on the left with minimal  associated atelectasis and additional findings, as above.  6. Study somewhat limited by breath hold artifact.    UPDATE 10/16/2024  Evelyn Jerry is 74 y.o.,  female,  Communication with son as  for history and physical  Pulmonary effort is normal. No respiratory distress.      Breath sounds: Normal breath sounds. No wheezing, rhonchi or rales.   Abdominal:      General: Bowel sounds are normal. There is no distension.      Tenderness: There is no abdominal tenderness. There is no guarding.   Musculoskeletal:         General: No swelling.      Cervical back: Normal range of motion.      Right lower leg: No edema.      Left lower leg: No edema.      Comments: Curvature of neck/upper back   Skin:     General: Skin is dry.   Neurological:      Mental Status: She is alert and oriented to person, place, and time.   Psychiatric:         Mood and Affect: Mood normal.         Behavior: Behavior normal.                   PROVISIONAL DIAGNOSES / SURGERY:      Iron deficiency anemia, unspecified iron deficiency anemia type [D50.9]    ESOPHAGOGASTRODUODENOSCOPY BIOPSY, COLONOSCOPY DIAGNOSTIC     Patient Active Problem List    Diagnosis Date Noted    Iron deficiency anemia 08/30/2024    Normocytic anemia 08/02/2024    Hematuria 08/02/2024           EFREM Reid - CNP on 10/16/2024 at 9:11 AM

## 2024-10-16 NOTE — DISCHARGE INSTRUCTIONS
Sedation or General Anesthesia, Adult  Care After  Refer to this sheet in the next 24 hours. These instructions provide you with information on caring for yourself after your procedure. Your caregiver may also give you more specific instructions. Your treatment has been planned according to current medical practices, but problems sometimes occur. Call your caregiver if you have any problems or questions after your procedure.   HOME CARE INSTRUCTIONS   Do not participate in any activities that require you to be alert or coordinated. Do not:  Drive.  Swim.  Ride a bicycle.  Operate heavy machinery.  Cook.  Use power tools.  Climb ladders.  Work at heights.  Take a bath.  Do not drink alcohol.  Do not make any important decisions or sign legal documents.  Stay with an adult.  The first meal following your procedure should be light and small. Avoid solid foods if you feel sick to your stomach (nauseous) or if you throw up (vomit).  Drink enough fluids to keep your urine clear or pale yellow.  Only take your usual medicines or new medicines if your caregiver approves them.  Only take over-the-counter or prescription medicines for pain, discomfort, or fever as directed by your caregiver.  Keep all follow-up appointments as directed by your caregiver.  SEEK IMMEDIATE MEDICAL CARE IF:   You are not feeling normal or behaving normally after 24 hours.  You have persistent nausea and vomiting.  You are unable to drink fluids or eat food.  You have difficulty urinating.  You have difficulty breathing or speaking.  You have blue or gray skin.  There is difficulty waking or you cannot be woken up.  You have heavy bleeding, redness, or a lot of swelling where the sedative or anesthesia entered your skin (intravenous site).  You have a rash.  MAKE SURE YOU:  Understand these instructions.  Will watch your condition.  Will get help right away if you are not doing well or get worse.  Document Released: 12/18/2006 Document Revised:  rarely are they a serious problem.  Most of the time, you can treat them with simple changes to your diet and bowel habits. These changes include eating more fiber and not straining to pass stools. Most hemorrhoids don't need surgery or other treatment unless they are very large and painful or bleed a lot.  Follow-up care is a key part of your treatment and safety. Be sure to make and go to all appointments, and call your doctor if you are having problems. It's also a good idea to know your test results and keep a list of the medicines you take.  How can you care for yourself at home?  Sit in a few inches of warm water (sitz bath) 3 times a day and after bowel movements. The warm water helps with pain and itching.  Put ice on your anal area several times a day for 10 minutes at a time. Put a thin cloth between the ice and your skin. Follow this by placing a warm, wet towel on the area for another 10 to 20 minutes.  Take pain medicines exactly as directed.  If the doctor gave you a prescription medicine for pain, take it as prescribed.  If you are not taking a prescription pain medicine, ask your doctor if you can take an over-the-counter medicine.  Keep the anal area clean, but be gentle. Use water and a fragrance-free soap, or use baby wipes or medicated pads such as Tucks.  Wear cotton underwear and loose clothing to decrease moisture in the anal area.  Eat more fiber. Include foods such as whole-grain breads and cereals, raw vegetables, raw and dried fruits, and beans.  Drink plenty of fluids. If you have kidney, heart, or liver disease and have to limit fluids, talk with your doctor before you increase the amount of fluids you drink.  Use a stool softener that contains bran or psyllium. You can save money by buying bran or psyllium (available in bulk at most health food stores) and sprinkling it on foods or stirring it into fruit juice. Or you can use a product such as Metamucil or Hydrocil.  Practice healthy

## 2024-10-18 LAB — SURGICAL PATHOLOGY REPORT: NORMAL

## 2024-10-23 DIAGNOSIS — D50.9 IRON DEFICIENCY ANEMIA, UNSPECIFIED IRON DEFICIENCY ANEMIA TYPE: ICD-10-CM

## 2024-10-23 NOTE — PROGRESS NOTES
Reason for Referral:     No referring provider defined for this encounter.    Chief Complaint   Patient presents with    Follow-up     Post op egd/colonoscopy        1. Chronic idiopathic constipation    2. Iron deficiency anemia, unspecified iron deficiency anemia type    3. Atrophic gastritis without hemorrhage    4. AVM (arteriovenous malformation) of stomach, acquired        HISTORY OF PRESENT ILLNESS: Ms.Rosa Jerry is a 74 y.o. female accompanied by her son who says that she is a recent immigrant from [place] has been in the country snce May 2024 had progressive fatigue weakness and CBC done on June 19, 2024 and did show hemoglobin at 11.5 with normal iron panel and low TIBC patient started on iron supplement presents post scope follow up     10/24/24: she was noted to have stomach atrophic gastritis and AVM APCed. Also had bleeding tendency likely NSAID related.  She reports after pantoprazole and benefiber her bloating and constipation are doing much better.  She feels great.  Unfortunately she continues to take naproxen daily and the son says she has difficulty getting off of it.    8/2024: She does complain of abdominal distention, bloating, and growling.  She also has some constipation.  No evidence of blood in stool.  She does have dark stool however she attributes it to iron pill.  She denies any abdominal pain.  Did have hematuria and scheduled for cystoscopy also recent cholecystectomy     Previous Endoscopies  10/16/24  EGD  Atrophic gastritis-biopsied- moderate gastritis negative HP  2 AVMs in the stomach body-status post APC  Medium size hiatal hernia and irregular Z-line-biopsied- neg IM    Colono  3 small AVM in the ascending and transverse-status post APC  Few small erosions in the right colon likely NSAID related  1 diminutive sigmoid polyp-cold forceps- HP     Previous GI workup   CT  1. No kidney stones or obvious hydronephrosis. Probable anterior mid left  renal cyst, but assessment

## 2024-10-24 ENCOUNTER — OFFICE VISIT (OUTPATIENT)
Dept: GASTROENTEROLOGY | Age: 74
End: 2024-10-24
Payer: COMMERCIAL

## 2024-10-24 VITALS
DIASTOLIC BLOOD PRESSURE: 85 MMHG | HEART RATE: 91 BPM | WEIGHT: 116.8 LBS | BODY MASS INDEX: 22.05 KG/M2 | RESPIRATION RATE: 16 BRPM | TEMPERATURE: 98.2 F | SYSTOLIC BLOOD PRESSURE: 141 MMHG | HEIGHT: 61 IN | OXYGEN SATURATION: 98 %

## 2024-10-24 DIAGNOSIS — K59.04 CHRONIC IDIOPATHIC CONSTIPATION: Primary | ICD-10-CM

## 2024-10-24 DIAGNOSIS — K31.819 AVM (ARTERIOVENOUS MALFORMATION) OF STOMACH, ACQUIRED: ICD-10-CM

## 2024-10-24 DIAGNOSIS — D50.9 IRON DEFICIENCY ANEMIA, UNSPECIFIED IRON DEFICIENCY ANEMIA TYPE: ICD-10-CM

## 2024-10-24 DIAGNOSIS — K59.04 CHRONIC IDIOPATHIC CONSTIPATION: ICD-10-CM

## 2024-10-24 DIAGNOSIS — K29.40 ATROPHIC GASTRITIS WITHOUT HEMORRHAGE: ICD-10-CM

## 2024-10-24 PROCEDURE — 1123F ACP DISCUSS/DSCN MKR DOCD: CPT | Performed by: STUDENT IN AN ORGANIZED HEALTH CARE EDUCATION/TRAINING PROGRAM

## 2024-10-24 PROCEDURE — 99214 OFFICE O/P EST MOD 30 MIN: CPT | Performed by: STUDENT IN AN ORGANIZED HEALTH CARE EDUCATION/TRAINING PROGRAM

## 2024-10-24 RX ORDER — WHEAT DEXTRIN 3 G/3.8 G
4 POWDER (GRAM) ORAL 2 TIMES DAILY
Qty: 1 EACH | Refills: 0 | OUTPATIENT
Start: 2024-10-24

## 2024-10-24 RX ORDER — WHEAT DEXTRIN 3 G/3.8 G
4 POWDER (GRAM) ORAL 2 TIMES DAILY
Qty: 1 EACH | Refills: 0 | Status: SHIPPED | OUTPATIENT
Start: 2024-10-24

## 2024-11-14 DIAGNOSIS — R73.03 PREDIABETES: ICD-10-CM

## 2024-11-14 DIAGNOSIS — E55.9 VITAMIN D DEFICIENCY: ICD-10-CM

## 2024-11-14 DIAGNOSIS — D50.9 IRON DEFICIENCY ANEMIA, UNSPECIFIED IRON DEFICIENCY ANEMIA TYPE: ICD-10-CM

## 2024-11-14 DIAGNOSIS — I10 HYPERTENSION, UNSPECIFIED TYPE: ICD-10-CM

## 2024-11-14 RX ORDER — FERROUS SULFATE 325(65) MG
325 TABLET ORAL 2 TIMES DAILY
Qty: 180 TABLET | Refills: 1 | Status: SHIPPED | OUTPATIENT
Start: 2024-11-14

## 2024-11-14 RX ORDER — ERGOCALCIFEROL 1.25 MG/1
50000 CAPSULE, LIQUID FILLED ORAL WEEKLY
Qty: 12 CAPSULE | Refills: 1 | Status: SHIPPED | OUTPATIENT
Start: 2024-11-14

## 2024-11-14 RX ORDER — ONDANSETRON 4 MG/1
4 TABLET, ORALLY DISINTEGRATING ORAL 3 TIMES DAILY PRN
Qty: 21 TABLET | Refills: 0 | Status: SHIPPED | OUTPATIENT
Start: 2024-11-14

## 2024-11-14 RX ORDER — AMLODIPINE BESYLATE 5 MG/1
5 TABLET ORAL DAILY
Qty: 30 TABLET | Refills: 3 | Status: SHIPPED | OUTPATIENT
Start: 2024-11-14

## 2024-11-14 NOTE — TELEPHONE ENCOUNTER
Please Approve or Refuse.  Send to Pharmacy per Pt's Request: [facility]      Next Visit Date:  Visit date not found   Last Visit Date: 6/17/2024    Hemoglobin A1C (%)   Date Value   06/19/2024 5.6             ( goal A1C is < 7)   BP Readings from Last 3 Encounters:   10/24/24 (!) 141/85   10/16/24 137/67   08/30/24 (!) 145/79          (goal 120/80)  BUN   Date Value Ref Range Status   09/24/2024 27 (H) 8 - 23 mg/dL Final     Creatinine   Date Value Ref Range Status   09/24/2024 1.0 (H) 0.5 - 0.9 mg/dL Final     Potassium   Date Value Ref Range Status   09/24/2024 4.6 3.7 - 5.3 mmol/L Final     Comment:     SPECIMEN MODERATELY HEMOLYZED, RESULTS MAY BE ADVERSELY AFFECTED

## 2024-12-10 ENCOUNTER — HOSPITAL ENCOUNTER (OUTPATIENT)
Age: 74
Discharge: HOME | End: 2024-12-10
Payer: COMMERCIAL

## 2024-12-10 ENCOUNTER — OFFICE VISIT (OUTPATIENT)
Dept: ONCOLOGY | Age: 74
End: 2024-12-10
Payer: COMMERCIAL

## 2024-12-10 ENCOUNTER — TELEPHONE (OUTPATIENT)
Dept: ONCOLOGY | Age: 74
End: 2024-12-10

## 2024-12-10 VITALS
HEART RATE: 112 BPM | SYSTOLIC BLOOD PRESSURE: 127 MMHG | WEIGHT: 121 LBS | BODY MASS INDEX: 22.86 KG/M2 | TEMPERATURE: 96.9 F | DIASTOLIC BLOOD PRESSURE: 80 MMHG

## 2024-12-10 DIAGNOSIS — D50.0 IRON DEFICIENCY ANEMIA DUE TO CHRONIC BLOOD LOSS: ICD-10-CM

## 2024-12-10 DIAGNOSIS — R76.8 ANA POSITIVE: ICD-10-CM

## 2024-12-10 DIAGNOSIS — D89.0: ICD-10-CM

## 2024-12-10 DIAGNOSIS — K31.819 AVM (ARTERIOVENOUS MALFORMATION) OF STOMACH, ACQUIRED: Primary | ICD-10-CM

## 2024-12-10 LAB
ABSOLUTE BANDS: 0.09 K/UL (ref 0–1)
ALBUMIN SERPL-MCNC: 4 G/DL (ref 3.5–5.2)
ALP SERPL-CCNC: 73 U/L (ref 35–104)
ALT SERPL-CCNC: 15 U/L (ref 10–35)
ANION GAP SERPL CALCULATED.3IONS-SCNC: 10 MMOL/L (ref 9–16)
AST SERPL-CCNC: 24 U/L (ref 10–35)
BANDS: 1 % (ref 0–10)
BASOPHILS # BLD: 0 K/UL (ref 0–0.2)
BASOPHILS NFR BLD: 0 % (ref 0–2)
BILIRUB SERPL-MCNC: 0.3 MG/DL (ref 0–1.2)
BUN SERPL-MCNC: 27 MG/DL (ref 8–23)
CALCIUM SERPL-MCNC: 9.3 MG/DL (ref 8.6–10.4)
CHLORIDE SERPL-SCNC: 106 MMOL/L (ref 98–107)
CO2 SERPL-SCNC: 24 MMOL/L (ref 20–31)
CREAT SERPL-MCNC: 0.8 MG/DL (ref 0.7–1.2)
EOSINOPHIL # BLD: 0.36 K/UL (ref 0–0.4)
EOSINOPHILS RELATIVE PERCENT: 4 % (ref 0–4)
ERYTHROCYTE [DISTWIDTH] IN BLOOD BY AUTOMATED COUNT: 13.4 % (ref 11.5–14.9)
FERRITIN SERPL-MCNC: 84 NG/ML
FREE KAPPA/LAMBDA RATIO: 1.15 (ref 0.22–1.74)
GFR, ESTIMATED: 78 ML/MIN/1.73M2
GLUCOSE SERPL-MCNC: 101 MG/DL (ref 74–99)
HCT VFR BLD AUTO: 42.1 % (ref 36–46)
HGB BLD-MCNC: 13.9 G/DL (ref 12–16)
IGA SERPL-MCNC: 637 MG/DL (ref 70–400)
IGG SERPL-MCNC: 1985 MG/DL (ref 700–1600)
IGM SERPL-MCNC: 389 MG/DL (ref 40–230)
IRON SATN MFR SERPL: 49 % (ref 20–55)
IRON SERPL-MCNC: 119 UG/DL (ref 37–145)
KAPPA LC FREE SER-MCNC: 48.5 MG/L
LAMBDA LC FREE SERPL-MCNC: 42.2 MG/L (ref 4.2–27.7)
LYMPHOCYTES NFR BLD: 1.73 K/UL (ref 1–4.8)
LYMPHOCYTES RELATIVE PERCENT: 19 % (ref 24–44)
MCH RBC QN AUTO: 31.6 PG (ref 26–34)
MCHC RBC AUTO-ENTMCNC: 33.1 G/DL (ref 31–37)
MCV RBC AUTO: 95.3 FL (ref 80–100)
MONOCYTES NFR BLD: 1.09 K/UL (ref 0.1–1.3)
MONOCYTES NFR BLD: 12 % (ref 1–7)
MORPHOLOGY: NORMAL
NEUTROPHILS NFR BLD: 64 % (ref 36–66)
NEUTS SEG NFR BLD: 5.83 K/UL (ref 1.3–9.1)
PLATELET # BLD AUTO: 160 K/UL (ref 150–450)
PMV BLD AUTO: 9.6 FL (ref 6–12)
POTASSIUM SERPL-SCNC: 4.1 MMOL/L (ref 3.7–5.3)
PROT SERPL-MCNC: 7.9 G/DL (ref 6.6–8.7)
RBC # BLD AUTO: 4.41 M/UL (ref 4–5.2)
SODIUM SERPL-SCNC: 140 MMOL/L (ref 136–145)
TIBC SERPL-MCNC: 244 UG/DL (ref 250–450)
UNSATURATED IRON BINDING CAPACITY: 125 UG/DL (ref 112–347)
WBC OTHER # BLD: 9.1 K/UL (ref 3.5–11)

## 2024-12-10 PROCEDURE — 99214 OFFICE O/P EST MOD 30 MIN: CPT | Performed by: INTERNAL MEDICINE

## 2024-12-10 PROCEDURE — 83521 IG LIGHT CHAINS FREE EACH: CPT

## 2024-12-10 PROCEDURE — 82784 ASSAY IGA/IGD/IGG/IGM EACH: CPT

## 2024-12-10 PROCEDURE — 80053 COMPREHEN METABOLIC PANEL: CPT

## 2024-12-10 PROCEDURE — 84165 PROTEIN E-PHORESIS SERUM: CPT

## 2024-12-10 PROCEDURE — 82728 ASSAY OF FERRITIN: CPT

## 2024-12-10 PROCEDURE — 84155 ASSAY OF PROTEIN SERUM: CPT

## 2024-12-10 PROCEDURE — 83550 IRON BINDING TEST: CPT

## 2024-12-10 PROCEDURE — 85025 COMPLETE CBC W/AUTO DIFF WBC: CPT

## 2024-12-10 PROCEDURE — 83540 ASSAY OF IRON: CPT

## 2024-12-10 PROCEDURE — 36415 COLL VENOUS BLD VENIPUNCTURE: CPT

## 2024-12-10 PROCEDURE — 1123F ACP DISCUSS/DSCN MKR DOCD: CPT | Performed by: INTERNAL MEDICINE

## 2024-12-10 NOTE — PROGRESS NOTES
Patient ID: Evelyn Jerry, 1950, [id number], 74 y.o.  Referred by :  No ref. provider found   Reason for consultation: Normocytic anemia    Patient does not speak English and family translating      HISTORY OF PRESENT ILLNESS:    Oncologic History:    Evelyn Jerry is a very pleasant 74 y.o. female.  Who is immigrant [place] has been in the country for 3 months had progressive fatigue weakness and CBC done on 2024 and did show hemoglobin at 11.5 with normal iron panel and low TIBC patient started on iron supplement  Did have hematuria and scheduled for cystoscopy also recent cholecystectomy which was done and no abnormality    Interval history  Status post cystoscopy with no evidence of malignancy  On iron supplement and iron panel and hemoglobin up to normal  No hematuria    Endoscopies  10/16/24  EGD  Atrophic gastritis-biopsied- moderate gastritis negative HP  2 AVMs in the stomach body-status post APC  Medium size hiatal hernia and irregular Z-line-biopsied- neg IM  Status post endoscopy     Colono  3 small AVM in the ascending and transverse-status post APC  Few small erosions in the right colon likely NSAID related  1 diminutive sigmoid polyp-cold forceps- HP       Past Medical History:   Diagnosis Date    Anemia     Gallstones     Hypertension     Kidney stones     PONV (postoperative nausea and vomiting)     Pre-diabetes        Past Surgical History:   Procedure Laterality Date     SECTION      x 2    CHOLECYSTECTOMY N/A 7/15/2024    CHOLECYSTECTOMY LAPAROSCOPIC ROBOTIC XI performed by Hima Turcios IV, DO at [facility] OR    COLONOSCOPY N/A 10/16/2024    COLONOSCOPY POLYPECTOMY BIOPSY WITH CAUTERIZATION OF AVMS performed by Seth Carson MD at [facility] ENDO    CYSTOSCOPY Bilateral 2024    CYSTOSCOPY RETROGRADE PYELOGRAM BILATERAL performed by Scott Moy MD at [facility] OR    UPPER GASTROINTESTINAL ENDOSCOPY N/A 10/16/2024    ESOPHAGOGASTRODUODENOSCOPY BIOPSY WITH APC

## 2024-12-10 NOTE — TELEPHONE ENCOUNTER
AVS 12/10/24    Rtc in 4 months with labs    Exámenes ordenados hoy  Ferritin  Complete esto alrededor de 12/10/2024.  Iron and TIBC  Complete esto alrededor de 12/10/2024.  CBC with Auto Differential  Complete esto alrededor de 12/17/2024.  Comprehensive Metabolic Panel  Complete esto alrededor de 12/17/2024.  Electrophoresis Protein, Serum  Complete esto alrededor de 12/17/2024.  Immunoglobulin Panel (IgG, IgA, IgM)  Complete esto alrededor de 12/17/2024.  Teton/Lambda Quantitative Free Light Chains, Serum  Complete esto alrededor de 12/17/2024.    RV 04/08/25    Labs to be done week prior    PT was given AVS and appt schedule    Electronically signed by Myrtle Hansen on 12/10/2024 at 9:14 AM

## 2024-12-11 LAB
ALBUMIN PERCENT: 50 % (ref 45–65)
ALBUMIN SERPL-MCNC: 3.7 G/DL (ref 3.2–5.2)
ALPHA 2 PERCENT: 8 % (ref 6–13)
ALPHA1 GLOB SERPL ELPH-MCNC: 0.2 G/DL (ref 0.1–0.4)
ALPHA1 GLOB SERPL ELPH-MCNC: 3 % (ref 3–6)
ALPHA2 GLOB SERPL ELPH-MCNC: 0.6 G/DL (ref 0.5–0.9)
B-GLOBULIN SERPL ELPH-MCNC: 1 G/DL (ref 0.5–1.1)
B-GLOBULIN SERPL ELPH-MCNC: 13 % (ref 11–19)
GAMMA GLOB SERPL ELPH-MCNC: 1.9 G/DL (ref 0.5–1.5)
GAMMA GLOBULIN %: 26 % (ref 9–20)
PATHOLOGIST: ABNORMAL
PROT PATTERN SERPL ELPH-IMP: ABNORMAL
PROT SERPL-MCNC: 7.4 G/DL (ref 6.6–8.7)
TOTAL PROT. SUM,%: 100 % (ref 98–102)
TOTAL PROT. SUM: 7.4 G/DL (ref 6.3–8.2)

## 2024-12-12 DIAGNOSIS — I10 HYPERTENSION, UNSPECIFIED TYPE: ICD-10-CM

## 2024-12-12 DIAGNOSIS — D50.9 IRON DEFICIENCY ANEMIA, UNSPECIFIED IRON DEFICIENCY ANEMIA TYPE: ICD-10-CM

## 2024-12-12 DIAGNOSIS — E55.9 VITAMIN D DEFICIENCY: ICD-10-CM

## 2024-12-12 DIAGNOSIS — R73.03 PREDIABETES: ICD-10-CM

## 2024-12-13 RX ORDER — ERGOCALCIFEROL 1.25 MG/1
50000 CAPSULE, LIQUID FILLED ORAL WEEKLY
Qty: 12 CAPSULE | Refills: 0 | Status: SHIPPED | OUTPATIENT
Start: 2024-12-13

## 2024-12-13 RX ORDER — FERROUS SULFATE 325(65) MG
325 TABLET ORAL 2 TIMES DAILY
Qty: 180 TABLET | Refills: 1 | Status: SHIPPED | OUTPATIENT
Start: 2024-12-13

## 2024-12-13 RX ORDER — ONDANSETRON 4 MG/1
4 TABLET, ORALLY DISINTEGRATING ORAL 3 TIMES DAILY PRN
Qty: 21 TABLET | Refills: 0 | Status: SHIPPED | OUTPATIENT
Start: 2024-12-13

## 2024-12-13 RX ORDER — AMLODIPINE BESYLATE 5 MG/1
5 TABLET ORAL DAILY
Qty: 30 TABLET | Refills: 3 | Status: SHIPPED | OUTPATIENT
Start: 2024-12-13

## 2024-12-13 NOTE — TELEPHONE ENCOUNTER
Please Approve or Refuse.  Send to Pharmacy per Pt's Request:      Next Visit Date:  Visit date not found   Last Visit Date: 6/17/2024    Hemoglobin A1C (%)   Date Value   06/19/2024 5.6             ( goal A1C is < 7)   BP Readings from Last 3 Encounters:   12/10/24 127/80   10/24/24 (!) 141/85   10/16/24 137/67          (goal 120/80)  BUN   Date Value Ref Range Status   12/10/2024 27 (H) 8 - 23 mg/dL Final     Creatinine   Date Value Ref Range Status   12/10/2024 0.8 0.7 - 1.2 mg/dL Final     Potassium   Date Value Ref Range Status   12/10/2024 4.1 3.7 - 5.3 mmol/L Final     Comment:     Specimen hemolysis has exceeded the interference as defined by [employer]. Value may be falsely   increased. Suggest recollection if clinically indicated.

## 2025-01-15 DIAGNOSIS — I10 HYPERTENSION, UNSPECIFIED TYPE: ICD-10-CM

## 2025-01-15 NOTE — TELEPHONE ENCOUNTER
Please Approve or Refuse.  Send to Pharmacy per Pt's Request: [facility]     Next Visit Date:  Visit date not found   Last Visit Date: 6/17/2024    Hemoglobin A1C (%)   Date Value   06/19/2024 5.6             ( goal A1C is < 7)   BP Readings from Last 3 Encounters:   12/10/24 127/80   10/24/24 (!) 141/85   10/16/24 137/67          (goal 120/80)  BUN   Date Value Ref Range Status   12/10/2024 27 (H) 8 - 23 mg/dL Final     Creatinine   Date Value Ref Range Status   12/10/2024 0.8 0.7 - 1.2 mg/dL Final     Potassium   Date Value Ref Range Status   12/10/2024 4.1 3.7 - 5.3 mmol/L Final     Comment:     Specimen hemolysis has exceeded the interference as defined by [employer]. Value may be falsely   increased. Suggest recollection if clinically indicated.

## 2025-01-16 RX ORDER — AMLODIPINE BESYLATE 5 MG/1
5 TABLET ORAL DAILY
Qty: 30 TABLET | Refills: 3 | Status: SHIPPED | OUTPATIENT
Start: 2025-01-16

## 2025-01-16 RX ORDER — ONDANSETRON 4 MG/1
4 TABLET, ORALLY DISINTEGRATING ORAL 3 TIMES DAILY PRN
Qty: 21 TABLET | Refills: 0 | Status: SHIPPED | OUTPATIENT
Start: 2025-01-16

## 2025-02-13 DIAGNOSIS — R73.03 PREDIABETES: ICD-10-CM

## 2025-02-13 DIAGNOSIS — I10 HYPERTENSION, UNSPECIFIED TYPE: ICD-10-CM

## 2025-02-13 RX ORDER — ONDANSETRON 4 MG/1
4 TABLET, ORALLY DISINTEGRATING ORAL 3 TIMES DAILY PRN
Qty: 21 TABLET | Refills: 0 | Status: SHIPPED | OUTPATIENT
Start: 2025-02-13

## 2025-02-13 RX ORDER — AMLODIPINE BESYLATE 5 MG/1
5 TABLET ORAL DAILY
Qty: 30 TABLET | Refills: 3 | Status: SHIPPED | OUTPATIENT
Start: 2025-02-13

## 2025-03-14 DIAGNOSIS — R73.03 PREDIABETES: ICD-10-CM

## 2025-03-14 DIAGNOSIS — I10 HYPERTENSION, UNSPECIFIED TYPE: ICD-10-CM

## 2025-03-14 DIAGNOSIS — E55.9 VITAMIN D DEFICIENCY: ICD-10-CM

## 2025-03-14 DIAGNOSIS — D50.9 IRON DEFICIENCY ANEMIA, UNSPECIFIED IRON DEFICIENCY ANEMIA TYPE: ICD-10-CM

## 2025-03-15 RX ORDER — AMLODIPINE BESYLATE 5 MG/1
5 TABLET ORAL DAILY
Qty: 30 TABLET | Refills: 3 | Status: SHIPPED | OUTPATIENT
Start: 2025-03-15

## 2025-03-15 RX ORDER — ERGOCALCIFEROL 1.25 MG/1
50000 CAPSULE, LIQUID FILLED ORAL WEEKLY
Qty: 12 CAPSULE | Refills: 0 | Status: SHIPPED | OUTPATIENT
Start: 2025-03-15

## 2025-03-15 RX ORDER — FERROUS SULFATE 325(65) MG
325 TABLET ORAL 2 TIMES DAILY
Qty: 180 TABLET | Refills: 1 | Status: SHIPPED | OUTPATIENT
Start: 2025-03-15

## 2025-03-15 RX ORDER — ONDANSETRON 4 MG/1
4 TABLET, ORALLY DISINTEGRATING ORAL 3 TIMES DAILY PRN
Qty: 21 TABLET | Refills: 0 | Status: SHIPPED | OUTPATIENT
Start: 2025-03-15

## 2025-03-21 ENCOUNTER — HOSPITAL ENCOUNTER (OUTPATIENT)
Age: 75
Setting detail: SPECIMEN
Discharge: HOME | End: 2025-03-21
Payer: COMMERCIAL

## 2025-03-21 LAB
25(OH)D3 SERPL-MCNC: 50.6 NG/ML (ref 30–100)
ALBUMIN SERPL-MCNC: 3.9 G/DL (ref 3.5–5.2)
ALBUMIN/GLOB SERPL: 1 {RATIO} (ref 1–2.5)
ALP SERPL-CCNC: 75 U/L (ref 35–104)
ALT SERPL-CCNC: 22 U/L (ref 10–35)
ANION GAP SERPL CALCULATED.3IONS-SCNC: 9 MMOL/L (ref 9–16)
AST SERPL-CCNC: 21 U/L (ref 10–35)
BASOPHILS # BLD: 0.2 K/UL (ref 0–0.2)
BASOPHILS NFR BLD: 1 % (ref 0–2)
BILIRUB SERPL-MCNC: 0.4 MG/DL (ref 0–1.2)
BUN SERPL-MCNC: 27 MG/DL (ref 8–23)
CALCIUM SERPL-MCNC: 9.2 MG/DL (ref 8.6–10.4)
CHLORIDE SERPL-SCNC: 106 MMOL/L (ref 98–107)
CO2 SERPL-SCNC: 26 MMOL/L (ref 20–31)
CREAT SERPL-MCNC: 0.9 MG/DL (ref 0.6–0.9)
CRP SERPL HS-MCNC: <3 MG/L (ref 0–5)
EOSINOPHIL # BLD: 0.8 K/UL (ref 0–0.4)
EOSINOPHILS RELATIVE PERCENT: 7 % (ref 0–4)
ERYTHROCYTE [DISTWIDTH] IN BLOOD BY AUTOMATED COUNT: 14.2 % (ref 11.5–14.9)
GFR, ESTIMATED: 68 ML/MIN/1.73M2
GLUCOSE SERPL-MCNC: 97 MG/DL (ref 74–99)
HCT VFR BLD AUTO: 40.1 % (ref 36–46)
HGB BLD-MCNC: 13.4 G/DL (ref 12–16)
LYMPHOCYTES NFR BLD: 4.3 K/UL (ref 1–4.8)
LYMPHOCYTES RELATIVE PERCENT: 36 % (ref 24–44)
MCH RBC QN AUTO: 30.9 PG (ref 26–34)
MCHC RBC AUTO-ENTMCNC: 33.3 G/DL (ref 31–37)
MCV RBC AUTO: 92.7 FL (ref 80–100)
MONOCYTES NFR BLD: 1.4 K/UL (ref 0.1–1.3)
MONOCYTES NFR BLD: 12 % (ref 1–7)
NEUTROPHILS NFR BLD: 44 % (ref 36–66)
NEUTS SEG NFR BLD: 5.3 K/UL (ref 1.3–9.1)
PLATELET # BLD AUTO: 188 K/UL (ref 150–450)
PMV BLD AUTO: 9.3 FL (ref 6–12)
POTASSIUM SERPL-SCNC: 4.3 MMOL/L (ref 3.7–5.3)
PROT SERPL-MCNC: 7.7 G/DL (ref 6.6–8.7)
RBC # BLD AUTO: 4.33 M/UL (ref 4–5.2)
SODIUM SERPL-SCNC: 141 MMOL/L (ref 136–145)
WBC OTHER # BLD: 11.9 K/UL (ref 3.5–11)

## 2025-03-21 PROCEDURE — 82306 VITAMIN D 25 HYDROXY: CPT

## 2025-03-21 PROCEDURE — 80053 COMPREHEN METABOLIC PANEL: CPT

## 2025-03-21 PROCEDURE — 85025 COMPLETE CBC W/AUTO DIFF WBC: CPT

## 2025-03-21 PROCEDURE — 36415 COLL VENOUS BLD VENIPUNCTURE: CPT

## 2025-03-21 PROCEDURE — 86140 C-REACTIVE PROTEIN: CPT

## 2025-03-24 ENCOUNTER — TELEPHONE (OUTPATIENT)
Dept: ONCOLOGY | Age: 75
End: 2025-03-24

## 2025-03-24 NOTE — TELEPHONE ENCOUNTER
**LVM TO RESCHEDULE APPT, PROVIDER WILL BE OUT OF OFFICE**     Electronically signed by Myrtle Hansen on 3/24/2025 at 2:29 PM

## 2025-04-01 ENCOUNTER — TELEPHONE (OUTPATIENT)
Dept: ONCOLOGY | Age: 75
End: 2025-04-01

## 2025-04-01 NOTE — TELEPHONE ENCOUNTER
Office called pt to reschedule appt, provider will be out of office 4/8, lvm    Electronically signed by Myrtle Hansen on 4/1/2025 at 11:50 AM

## 2025-04-14 DIAGNOSIS — R73.03 PREDIABETES: ICD-10-CM

## 2025-04-14 DIAGNOSIS — I10 HYPERTENSION, UNSPECIFIED TYPE: ICD-10-CM

## 2025-04-14 RX ORDER — AMLODIPINE BESYLATE 5 MG/1
5 TABLET ORAL DAILY
Qty: 30 TABLET | Refills: 3 | Status: SHIPPED | OUTPATIENT
Start: 2025-04-14

## 2025-04-14 RX ORDER — ONDANSETRON 4 MG/1
4 TABLET, ORALLY DISINTEGRATING ORAL 3 TIMES DAILY PRN
Qty: 21 TABLET | Refills: 0 | Status: SHIPPED | OUTPATIENT
Start: 2025-04-14

## 2025-04-14 NOTE — TELEPHONE ENCOUNTER
Please Approve or Refuse.  Send to Pharmacy per Pt's Request: [facility]      Next Visit Date:  4/24/2025   Last Visit Date: 6/17/2024    Hemoglobin A1C (%)   Date Value   06/19/2024 5.6             ( goal A1C is < 7)   BP Readings from Last 3 Encounters:   12/10/24 127/80   10/24/24 (!) 141/85   10/16/24 137/67          (goal 120/80)  BUN   Date Value Ref Range Status   03/21/2025 27 (H) 8 - 23 mg/dL Final     Creatinine   Date Value Ref Range Status   03/21/2025 0.9 0.6 - 0.9 mg/dL Final     Potassium   Date Value Ref Range Status   03/21/2025 4.3 3.7 - 5.3 mmol/L Final

## 2025-05-16 DIAGNOSIS — E55.9 VITAMIN D DEFICIENCY: ICD-10-CM

## 2025-05-16 DIAGNOSIS — I10 HYPERTENSION, UNSPECIFIED TYPE: ICD-10-CM

## 2025-05-19 RX ORDER — AMLODIPINE BESYLATE 5 MG/1
5 TABLET ORAL DAILY
Qty: 30 TABLET | Refills: 3 | Status: SHIPPED | OUTPATIENT
Start: 2025-05-19

## 2025-05-19 RX ORDER — ONDANSETRON 4 MG/1
4 TABLET, ORALLY DISINTEGRATING ORAL 3 TIMES DAILY PRN
Qty: 21 TABLET | Refills: 0 | Status: SHIPPED | OUTPATIENT
Start: 2025-05-19

## 2025-05-19 RX ORDER — ERGOCALCIFEROL 1.25 MG/1
50000 CAPSULE, LIQUID FILLED ORAL WEEKLY
Qty: 12 CAPSULE | Refills: 0 | Status: SHIPPED | OUTPATIENT
Start: 2025-05-19

## 2025-05-19 NOTE — TELEPHONE ENCOUNTER
Please Approve or Refuse.  Send to Pharmacy per Pt's Request:      Next Visit Date:  Visit date not found   Last Visit Date: 6/17/2024    Hemoglobin A1C (%)   Date Value   06/19/2024 5.6             ( goal A1C is < 7)   BP Readings from Last 3 Encounters:   12/10/24 127/80   10/24/24 (!) 141/85   10/16/24 137/67          (goal 120/80)  BUN   Date Value Ref Range Status   03/21/2025 27 (H) 8 - 23 mg/dL Final     Creatinine   Date Value Ref Range Status   03/21/2025 0.9 0.6 - 0.9 mg/dL Final     Potassium   Date Value Ref Range Status   03/21/2025 4.3 3.7 - 5.3 mmol/L Final

## 2025-05-29 ENCOUNTER — HOSPITAL ENCOUNTER (OUTPATIENT)
Dept: OCCUPATIONAL THERAPY | Age: 75
Setting detail: THERAPIES SERIES
Discharge: HOME | End: 2025-05-29
Payer: COMMERCIAL

## 2025-05-29 PROCEDURE — 97110 THERAPEUTIC EXERCISES: CPT

## 2025-05-29 PROCEDURE — 97165 OT EVAL LOW COMPLEX 30 MIN: CPT

## 2025-05-29 NOTE — THERAPY EVALUATION
[] [facility]  Outpatient Rehabilitation &  Therapy  [address]  [phone]  F: [phone] [] [facility]  Outpatient Rehabilitation &  Therapy  [address]   [address]  P: [phone]  F: [phone] [] [facility]  Outpatient Rehabilitation &  Therapy  [address]  P: [phone]  F: [phone] [] [facility]  Outpatient Rehabilitation &  Therapy  [address]   P: [phone]  F: [phone] [x] [facility]   Outpatient Rehabilitation   & Therapy  [address]  P: [phone]   F: [phone]       Occupational Therapy Hand & Upper Extremity  Initial Evaluation    Date: 2025      Patient: Evelyn Jerry  : 1950  MRN: [id number]  Referring Provider:  Marry Sousa MD   Insurance: [employer]   [facility] after 30th visit   Medical Diagnosis: R53.1 (ICD-10-CM) - Weakness    Rehab Codes: pain in hand M79.646,, pain in wrist M25.53,, stiffness in hand M25.64,, or stiffness in wrist M25.63,  Onset Date: 25    Patrick Olivares Appt: 4 weeks    Subjective:   Pt arrives with family member. Speaks Peruvian - family translating  CC: pain dorsal hand and wrist. Has difficulty with extension, pain 2nd and 3rd MCP. Does not like wearing splint. Wants to be able to use fingers R hand  HPI: 7 weeks weeks status post right wrist proximal row carpectomy and tendon transfer EIP to the EDC DOS (2025)     Mechanism of Injury: NA Surgery Date:25  Precautions: Other splint    Fall Risk:  [] Yes  [x] No    Involved Extremity: Right   Dominant Extremity: Right    Past Medical History: Refer to Epic          Comorbidities: NA  Past Medical History:   Diagnosis Date    Anemia     Gallstones     Hypertension     Kidney stones     PONV (postoperative nausea and vomiting)     Pre-diabetes      Past Surgical History:   Procedure Laterality Date     SECTION      x 2

## 2025-06-02 ENCOUNTER — HOSPITAL ENCOUNTER (OUTPATIENT)
Dept: OCCUPATIONAL THERAPY | Age: 75
Setting detail: THERAPIES SERIES
Discharge: HOME | End: 2025-06-02

## 2025-06-02 NOTE — FLOWSHEET NOTE
[] [facility]  Outpatient Rehabilitation &  Therapy  [address]  [phone]  F: [phone] [] [facility]  Outpatient Rehabilitation &  Therapy  [address]   [address]  P: [phone]  F: [phone] [] [facility]  Outpatient Rehabilitation &  Therapy  [address]  P: [phone]  F: [phone]  [x] [facility]  Rehabilitation Services  [address]  [place], [place] [address]  Phone [phone]  Fax [phone]      Occupational Therapy Cancel/No Show note    Date: 2025  Patient: Evelyn Jerry  : 1950  MRN: [id number]    Cancels/No Shows to date:     For today's appointment patient:    [x]  Cancelled    [] Rescheduled appointment    [] No-show     Reason given by patient:    []  Patient ill    []  Conflicting appointment    [] No transportation      [] Conflict with work    [] No reason given    [] Weather related    [] COVID-19    [x] Other:  Per desk, son called to cancel both appointments this week, he is too busy to bring pt in.    Comments:        [x] Next appointment was confirmed    Electronically signed by: ARASELI Lew/RUSS

## 2025-06-19 DIAGNOSIS — R73.03 PREDIABETES: ICD-10-CM

## 2025-06-19 DIAGNOSIS — E55.9 VITAMIN D DEFICIENCY: ICD-10-CM

## 2025-06-19 DIAGNOSIS — I10 HYPERTENSION, UNSPECIFIED TYPE: ICD-10-CM

## 2025-06-21 RX ORDER — ERGOCALCIFEROL 1.25 MG/1
50000 CAPSULE, LIQUID FILLED ORAL WEEKLY
Qty: 12 CAPSULE | Refills: 0 | Status: SHIPPED | OUTPATIENT
Start: 2025-06-21

## 2025-06-21 RX ORDER — ONDANSETRON 4 MG/1
4 TABLET, ORALLY DISINTEGRATING ORAL 3 TIMES DAILY PRN
Qty: 21 TABLET | Refills: 0 | Status: SHIPPED | OUTPATIENT
Start: 2025-06-21

## 2025-06-21 RX ORDER — AMLODIPINE BESYLATE 5 MG/1
5 TABLET ORAL DAILY
Qty: 30 TABLET | Refills: 3 | Status: SHIPPED | OUTPATIENT
Start: 2025-06-21

## 2025-07-21 DIAGNOSIS — I10 HYPERTENSION, UNSPECIFIED TYPE: ICD-10-CM

## 2025-07-21 DIAGNOSIS — R73.03 PREDIABETES: ICD-10-CM

## 2025-07-21 RX ORDER — AMLODIPINE BESYLATE 5 MG/1
5 TABLET ORAL DAILY
Qty: 30 TABLET | Refills: 3 | Status: SHIPPED | OUTPATIENT
Start: 2025-07-21

## 2025-07-21 RX ORDER — ONDANSETRON 4 MG/1
4 TABLET, ORALLY DISINTEGRATING ORAL 3 TIMES DAILY PRN
Qty: 21 TABLET | Refills: 0 | Status: SHIPPED | OUTPATIENT
Start: 2025-07-21

## 2025-07-21 NOTE — TELEPHONE ENCOUNTER
Please Approve or Refuse.  Send to Pharmacy per Pt's Request: [facility]      Next Visit Date:  Visit date not found   Last Visit Date: 6/17/2024    Hemoglobin A1C (%)   Date Value   06/19/2024 5.6             ( goal A1C is < 7)   BP Readings from Last 3 Encounters:   12/10/24 127/80   10/24/24 (!) 141/85   10/16/24 137/67          (goal 120/80)  BUN   Date Value Ref Range Status   06/23/2025 28 (H) (4     - 25) MG/DL Final     Creatinine   Date Value Ref Range Status   06/23/2025 1.11 (H) (0.52  - 1.04) MG/DL Final     Potassium   Date Value Ref Range Status   06/23/2025 4.5 (3.5   - 5.1) MMOL/L Final

## 2025-08-17 DIAGNOSIS — R73.03 PREDIABETES: ICD-10-CM

## 2025-08-17 DIAGNOSIS — I10 HYPERTENSION, UNSPECIFIED TYPE: ICD-10-CM

## 2025-08-17 DIAGNOSIS — E55.9 VITAMIN D DEFICIENCY: ICD-10-CM

## 2025-08-18 RX ORDER — ERGOCALCIFEROL 1.25 MG/1
50000 CAPSULE, LIQUID FILLED ORAL WEEKLY
Qty: 12 CAPSULE | Refills: 0 | Status: SHIPPED | OUTPATIENT
Start: 2025-08-18

## 2025-08-18 RX ORDER — ONDANSETRON 4 MG/1
4 TABLET, ORALLY DISINTEGRATING ORAL 3 TIMES DAILY PRN
Qty: 21 TABLET | Refills: 0 | Status: SHIPPED | OUTPATIENT
Start: 2025-08-18

## 2025-08-18 RX ORDER — AMLODIPINE BESYLATE 5 MG/1
5 TABLET ORAL DAILY
Qty: 30 TABLET | Refills: 3 | Status: SHIPPED | OUTPATIENT
Start: 2025-08-18

## 2025-08-31 ENCOUNTER — APPOINTMENT (OUTPATIENT)
Dept: CT IMAGING | Age: 75
End: 2025-08-31
Payer: COMMERCIAL

## 2025-08-31 ENCOUNTER — HOSPITAL ENCOUNTER (EMERGENCY)
Age: 75
Discharge: HOME | End: 2025-08-31
Attending: EMERGENCY MEDICINE
Payer: COMMERCIAL

## 2025-08-31 VITALS
OXYGEN SATURATION: 100 % | BODY MASS INDEX: 23.79 KG/M2 | TEMPERATURE: 98.1 F | RESPIRATION RATE: 18 BRPM | WEIGHT: 126 LBS | DIASTOLIC BLOOD PRESSURE: 79 MMHG | SYSTOLIC BLOOD PRESSURE: 130 MMHG | HEART RATE: 92 BPM | HEIGHT: 61 IN

## 2025-08-31 DIAGNOSIS — N28.89 RENAL MASS: ICD-10-CM

## 2025-08-31 DIAGNOSIS — N32.89 BLADDER MASS: Primary | ICD-10-CM

## 2025-08-31 DIAGNOSIS — N39.0 ACUTE UTI: ICD-10-CM

## 2025-08-31 LAB
ALBUMIN SERPL-MCNC: 4 G/DL (ref 3.5–5.2)
ALP SERPL-CCNC: 77 U/L (ref 35–104)
ALT SERPL-CCNC: 10 U/L (ref 10–35)
ANION GAP SERPL CALCULATED.3IONS-SCNC: 13 MMOL/L (ref 9–16)
AST SERPL-CCNC: 21 U/L (ref 10–35)
BACTERIA URNS QL MICRO: ABNORMAL
BASOPHILS # BLD: 0.06 K/UL (ref 0–0.2)
BASOPHILS NFR BLD: 0 % (ref 0–2)
BILIRUB SERPL-MCNC: 0.3 MG/DL (ref 0–1.2)
BILIRUB UR QL STRIP: NEGATIVE
BUN SERPL-MCNC: 20 MG/DL (ref 8–23)
CALCIUM SERPL-MCNC: 9.8 MG/DL (ref 8.6–10.4)
CASTS #/AREA URNS LPF: ABNORMAL /LPF
CASTS #/AREA URNS LPF: ABNORMAL /LPF
CHLORIDE SERPL-SCNC: 103 MMOL/L (ref 98–107)
CLARITY UR: ABNORMAL
CO2 SERPL-SCNC: 23 MMOL/L (ref 20–31)
COLOR UR: YELLOW
CREAT SERPL-MCNC: 1.1 MG/DL (ref 0.7–1.2)
EOSINOPHIL # BLD: 0.22 K/UL (ref 0–0.44)
EOSINOPHILS RELATIVE PERCENT: 2 % (ref 0–4)
EPI CELLS #/AREA URNS HPF: ABNORMAL /HPF
ERYTHROCYTE [DISTWIDTH] IN BLOOD BY AUTOMATED COUNT: 12.7 % (ref 11.5–14.9)
GFR, ESTIMATED: 53 ML/MIN/1.73M2
GLUCOSE SERPL-MCNC: 185 MG/DL (ref 74–99)
GLUCOSE UR STRIP-MCNC: NEGATIVE MG/DL
HCT VFR BLD AUTO: 42.3 % (ref 36–46)
HGB BLD-MCNC: 14.3 G/DL (ref 12–16)
HGB UR QL STRIP.AUTO: ABNORMAL
IMM GRANULOCYTES # BLD AUTO: 0.07 K/UL (ref 0–0.3)
IMM GRANULOCYTES NFR BLD: 1 %
KETONES UR STRIP-MCNC: NEGATIVE MG/DL
LEUKOCYTE ESTERASE UR QL STRIP: ABNORMAL
LYMPHOCYTES NFR BLD: 2.19 K/UL (ref 1.1–3.7)
LYMPHOCYTES RELATIVE PERCENT: 16 % (ref 24–44)
MCH RBC QN AUTO: 30.9 PG (ref 26–34)
MCHC RBC AUTO-ENTMCNC: 33.8 G/DL (ref 31–37)
MCV RBC AUTO: 91.4 FL (ref 80–100)
MONOCYTES NFR BLD: 1.36 K/UL (ref 0.1–1.2)
MONOCYTES NFR BLD: 10 % (ref 3–12)
NEUTROPHILS NFR BLD: 71 % (ref 36–66)
NEUTS SEG NFR BLD: 10.25 K/UL (ref 1.5–8.1)
NITRITE UR QL STRIP: NEGATIVE
NRBC BLD-RTO: 0 PER 100 WBC
PH UR STRIP: 6 [PH] (ref 5–8)
PLATELET # BLD AUTO: 175 K/UL (ref 150–450)
PMV BLD AUTO: 10.7 FL (ref 8–13.5)
POTASSIUM SERPL-SCNC: 4.2 MMOL/L (ref 3.7–5.3)
PROT SERPL-MCNC: 8.1 G/DL (ref 6.6–8.7)
PROT UR STRIP-MCNC: ABNORMAL MG/DL
RBC # BLD AUTO: 4.63 M/UL (ref 3.95–5.11)
RBC #/AREA URNS HPF: ABNORMAL /HPF
SODIUM SERPL-SCNC: 139 MMOL/L (ref 136–145)
SP GR UR STRIP: 1.01 (ref 1–1.03)
UROBILINOGEN UR STRIP-ACNC: NORMAL EU/DL (ref 0–1)
WBC #/AREA URNS HPF: ABNORMAL /HPF
WBC OTHER # BLD: 14.2 K/UL (ref 3.5–11)

## 2025-08-31 PROCEDURE — 87186 SC STD MICRODIL/AGAR DIL: CPT

## 2025-08-31 PROCEDURE — 6360000002 HC RX W HCPCS: Performed by: EMERGENCY MEDICINE

## 2025-08-31 PROCEDURE — 80053 COMPREHEN METABOLIC PANEL: CPT

## 2025-08-31 PROCEDURE — 81001 URINALYSIS AUTO W/SCOPE: CPT

## 2025-08-31 PROCEDURE — 36415 COLL VENOUS BLD VENIPUNCTURE: CPT

## 2025-08-31 PROCEDURE — 74176 CT ABD & PELVIS W/O CONTRAST: CPT

## 2025-08-31 PROCEDURE — 99284 EMERGENCY DEPT VISIT MOD MDM: CPT

## 2025-08-31 PROCEDURE — 85025 COMPLETE CBC W/AUTO DIFF WBC: CPT

## 2025-08-31 PROCEDURE — 87086 URINE CULTURE/COLONY COUNT: CPT

## 2025-08-31 PROCEDURE — 96374 THER/PROPH/DIAG INJ IV PUSH: CPT

## 2025-08-31 PROCEDURE — 87077 CULTURE AEROBIC IDENTIFY: CPT

## 2025-08-31 RX ORDER — ONDANSETRON 4 MG/1
4 TABLET, ORALLY DISINTEGRATING ORAL 3 TIMES DAILY PRN
Qty: 21 TABLET | Refills: 0 | Status: SHIPPED | OUTPATIENT
Start: 2025-08-31

## 2025-08-31 RX ORDER — MORPHINE SULFATE 4 MG/ML
4 INJECTION, SOLUTION INTRAMUSCULAR; INTRAVENOUS ONCE
Refills: 0 | Status: COMPLETED | OUTPATIENT
Start: 2025-08-31 | End: 2025-08-31

## 2025-08-31 RX ORDER — IBUPROFEN 800 MG/1
800 TABLET, FILM COATED ORAL EVERY 8 HOURS PRN
Qty: 20 TABLET | Refills: 0 | Status: SHIPPED | OUTPATIENT
Start: 2025-08-31

## 2025-08-31 RX ORDER — HYDROCODONE BITARTRATE AND ACETAMINOPHEN 5; 325 MG/1; MG/1
1 TABLET ORAL EVERY 8 HOURS PRN
Qty: 12 TABLET | Refills: 0 | Status: SHIPPED | OUTPATIENT
Start: 2025-08-31 | End: 2025-09-04

## 2025-08-31 RX ADMIN — MORPHINE SULFATE 4 MG: 4 INJECTION, SOLUTION INTRAMUSCULAR; INTRAVENOUS at 12:27

## 2025-08-31 ASSESSMENT — LIFESTYLE VARIABLES
HOW MANY STANDARD DRINKS CONTAINING ALCOHOL DO YOU HAVE ON A TYPICAL DAY: PATIENT DOES NOT DRINK
HOW OFTEN DO YOU HAVE A DRINK CONTAINING ALCOHOL: NEVER

## 2025-08-31 ASSESSMENT — PAIN - FUNCTIONAL ASSESSMENT
PAIN_FUNCTIONAL_ASSESSMENT: 0-10

## 2025-08-31 ASSESSMENT — PAIN SCALES - GENERAL
PAINLEVEL_OUTOF10: 5
PAINLEVEL_OUTOF10: 8
PAINLEVEL_OUTOF10: 8

## 2025-08-31 ASSESSMENT — PAIN DESCRIPTION - LOCATION: LOCATION: FLANK

## 2025-08-31 ASSESSMENT — PAIN DESCRIPTION - ORIENTATION: ORIENTATION: LEFT

## 2025-09-01 LAB
MICROORGANISM SPEC CULT: ABNORMAL
SPECIMEN DESCRIPTION: ABNORMAL

## 2025-09-01 RX ORDER — CEFDINIR 300 MG/1
300 CAPSULE ORAL 2 TIMES DAILY
Qty: 14 CAPSULE | Refills: 0 | Status: SHIPPED | OUTPATIENT
Start: 2025-09-01 | End: 2025-09-08

## 2025-09-04 ENCOUNTER — TELEPHONE (OUTPATIENT)
Dept: FAMILY MEDICINE CLINIC | Age: 75
End: 2025-09-04

## (undated) DEVICE — ARM DRAPE

## (undated) DEVICE — SYRINGE MED 30ML STD CLR PLAS LUERLOCK TIP N CTRL DISP

## (undated) DEVICE — SYRINGE MED 20ML STD CLR PLAS LUERLOCK TIP N CTRL DISP

## (undated) DEVICE — PAD PT POS 36 IN SURGYPAD DISP

## (undated) DEVICE — ST CHARLES GEN LAPAROSCOPY: Brand: MEDLINE INDUSTRIES, INC.

## (undated) DEVICE — FIAPC® PROBE W/ FILTER 2200 A OD 2.3MM/6.9FR; L 2.2M/7.2FT: Brand: ERBE

## (undated) DEVICE — BLADELESS OBTURATOR: Brand: WECK VISTA

## (undated) DEVICE — SEAL

## (undated) DEVICE — SOLUTION IV 1000ML 0.9% SOD CHL PH 5 INJ USP VIAFLX PLAS

## (undated) DEVICE — DEVICE TRCR 12X9X3IN WHT CLSR DISP OMNICLOSE

## (undated) DEVICE — FORCEPS BX L240CM WRK CHN 2.8MM STD CAP W/ NDL MIC MESH

## (undated) DEVICE — ERBE NESSY® OMEGA PLATE USA (85+23)CM² , WITH CABLE 3 M: Brand: ERBE

## (undated) DEVICE — GLOVE ORANGE PI 7 1/2   MSG9075

## (undated) DEVICE — ST CHARLES CYSTO: Brand: MEDLINE INDUSTRIES, INC.

## (undated) DEVICE — SUTURE VICRYL + SZ 4-0 L27IN ABSRB UD PS-2 3/8 CIR REV CUT VCP426H

## (undated) DEVICE — INSUFFLATION NEEDLE TO ESTABLISH PNEUMOPERITONEUM.: Brand: INSUFFLATION NEEDLE

## (undated) DEVICE — CONE TIP URETERAL CATHETER: Brand: URETERAL CATHETER

## (undated) DEVICE — BLANKET WRM W29.9XL79.1IN UP BODY FORC AIR MISTRAL-AIR

## (undated) DEVICE — ENDO KIT W/SYRINGE: Brand: MEDLINE INDUSTRIES, INC.

## (undated) DEVICE — DEFENDO AIR WATER SUCTION AND BIOPSY VALVE KIT FOR  OLYMPUS: Brand: DEFENDO AIR/WATER/SUCTION AND BIOPSY VALVE

## (undated) DEVICE — ANCHOR TISSUE RETRIEVAL SYSTEM, BAG SIZE 125 ML, PORT SIZE 8 MM: Brand: ANCHOR TISSUE RETRIEVAL SYSTEM

## (undated) DEVICE — GLOVE ORANGE PI 8   MSG9080

## (undated) DEVICE — LIQUIBAND RAPID ADHESIVE 36/CS 0.8ML: Brand: MEDLINE

## (undated) DEVICE — NEEDLE, QUINCKE, 18GX3.5": Brand: MEDLINE

## (undated) DEVICE — SOLUTION IRRIG 1000ML STRL H2O USP PLAS POUR BTL

## (undated) DEVICE — GLOVE SURG SZ 8 L12IN FNGR THK79MIL GRN LTX FREE

## (undated) DEVICE — SYRINGE MED 10ML LUERLOCK TIP W/O SFTY DISP

## (undated) DEVICE — BITEBLOCK 54FR W/ DENT RIM BLOX

## (undated) DEVICE — TROCAR: Brand: KII FIOS FIRST ENTRY